# Patient Record
Sex: MALE | Race: WHITE | Employment: FULL TIME | URBAN - NONMETROPOLITAN AREA
[De-identification: names, ages, dates, MRNs, and addresses within clinical notes are randomized per-mention and may not be internally consistent; named-entity substitution may affect disease eponyms.]

---

## 2023-02-09 ENCOUNTER — OFFICE VISIT (OUTPATIENT)
Dept: ENT CLINIC | Age: 43
End: 2023-02-09
Payer: COMMERCIAL

## 2023-02-09 ENCOUNTER — APPOINTMENT (OUTPATIENT)
Dept: CT IMAGING | Age: 43
End: 2023-02-09
Payer: COMMERCIAL

## 2023-02-09 ENCOUNTER — HOSPITAL ENCOUNTER (EMERGENCY)
Age: 43
Discharge: HOME | End: 2023-02-09
Payer: COMMERCIAL

## 2023-02-09 VITALS
SYSTOLIC BLOOD PRESSURE: 154 MMHG | OXYGEN SATURATION: 91 % | DIASTOLIC BLOOD PRESSURE: 92 MMHG | RESPIRATION RATE: 20 BRPM | WEIGHT: 315 LBS | HEART RATE: 106 BPM | TEMPERATURE: 99.3 F | HEIGHT: 72 IN | BODY MASS INDEX: 42.66 KG/M2

## 2023-02-09 VITALS — SYSTOLIC BLOOD PRESSURE: 138 MMHG | DIASTOLIC BLOOD PRESSURE: 86 MMHG

## 2023-02-09 DIAGNOSIS — J36 PERITONSILLAR ABSCESS: Primary | ICD-10-CM

## 2023-02-09 DIAGNOSIS — J38.3 LESION OF VOCAL CORD: ICD-10-CM

## 2023-02-09 LAB
ALBUMIN SERPL-MCNC: 3.7 G/DL (ref 3.5–5.2)
ALP BLD-CCNC: 128 U/L (ref 40–130)
ALT SERPL-CCNC: 31 U/L (ref 5–41)
ANION GAP SERPL CALCULATED.3IONS-SCNC: 13 MMOL/L (ref 7–19)
AST SERPL-CCNC: 47 U/L (ref 5–40)
BASOPHILS ABSOLUTE: 0 K/UL (ref 0–0.2)
BASOPHILS RELATIVE PERCENT: 0.3 % (ref 0–1)
BILIRUB SERPL-MCNC: 4.2 MG/DL (ref 0.2–1.2)
BUN BLDV-MCNC: 12 MG/DL (ref 6–20)
CALCIUM SERPL-MCNC: 8.4 MG/DL (ref 8.6–10)
CHLORIDE BLD-SCNC: 102 MMOL/L (ref 98–111)
CO2: 21 MMOL/L (ref 22–29)
CREAT SERPL-MCNC: 0.5 MG/DL (ref 0.5–1.2)
EOSINOPHILS ABSOLUTE: 0 K/UL (ref 0–0.6)
EOSINOPHILS RELATIVE PERCENT: 0.3 % (ref 0–5)
GFR SERPL CREATININE-BSD FRML MDRD: >60 ML/MIN/{1.73_M2}
GLUCOSE BLD-MCNC: 121 MG/DL (ref 74–109)
HCT VFR BLD CALC: 43.2 % (ref 42–52)
HEMOGLOBIN: 14.8 G/DL (ref 14–18)
IMMATURE GRANULOCYTES #: 0.1 K/UL
LYMPHOCYTES ABSOLUTE: 1.1 K/UL (ref 1.1–4.5)
LYMPHOCYTES RELATIVE PERCENT: 8.7 % (ref 20–40)
MCH RBC QN AUTO: 30.6 PG (ref 27–31)
MCHC RBC AUTO-ENTMCNC: 34.3 G/DL (ref 33–37)
MCV RBC AUTO: 89.3 FL (ref 80–94)
MONOCYTES ABSOLUTE: 1 K/UL (ref 0–0.9)
MONOCYTES RELATIVE PERCENT: 8.1 % (ref 0–10)
NEUTROPHILS ABSOLUTE: 10.5 K/UL (ref 1.5–7.5)
NEUTROPHILS RELATIVE PERCENT: 81.7 % (ref 50–65)
PDW BLD-RTO: 14 % (ref 11.5–14.5)
PLATELET # BLD: 46 K/UL (ref 130–400)
PMV BLD AUTO: 11.3 FL (ref 9.4–12.4)
POTASSIUM SERPL-SCNC: 3.7 MMOL/L (ref 3.5–5)
RBC # BLD: 4.84 M/UL (ref 4.7–6.1)
SODIUM BLD-SCNC: 136 MMOL/L (ref 136–145)
TOTAL PROTEIN: 7.4 G/DL (ref 6.6–8.7)
WBC # BLD: 12.8 K/UL (ref 4.8–10.8)

## 2023-02-09 PROCEDURE — 96365 THER/PROPH/DIAG IV INF INIT: CPT

## 2023-02-09 PROCEDURE — 99204 OFFICE O/P NEW MOD 45 MIN: CPT | Performed by: OTOLARYNGOLOGY

## 2023-02-09 PROCEDURE — 42700 I&D ABSCESS PERITONSILLAR: CPT | Performed by: OTOLARYNGOLOGY

## 2023-02-09 PROCEDURE — 96376 TX/PRO/DX INJ SAME DRUG ADON: CPT

## 2023-02-09 PROCEDURE — 80053 COMPREHEN METABOLIC PANEL: CPT

## 2023-02-09 PROCEDURE — 96375 TX/PRO/DX INJ NEW DRUG ADDON: CPT

## 2023-02-09 PROCEDURE — 6360000002 HC RX W HCPCS: Performed by: PHYSICIAN ASSISTANT

## 2023-02-09 PROCEDURE — 36415 COLL VENOUS BLD VENIPUNCTURE: CPT

## 2023-02-09 PROCEDURE — 85025 COMPLETE CBC W/AUTO DIFF WBC: CPT

## 2023-02-09 PROCEDURE — 87040 BLOOD CULTURE FOR BACTERIA: CPT

## 2023-02-09 PROCEDURE — 70491 CT SOFT TISSUE NECK W/DYE: CPT

## 2023-02-09 PROCEDURE — 2580000003 HC RX 258: Performed by: PHYSICIAN ASSISTANT

## 2023-02-09 PROCEDURE — 6360000004 HC RX CONTRAST MEDICATION: Performed by: PHYSICIAN ASSISTANT

## 2023-02-09 PROCEDURE — 99285 EMERGENCY DEPT VISIT HI MDM: CPT

## 2023-02-09 RX ORDER — CLINDAMYCIN PALMITATE HYDROCHLORIDE 75 MG/5ML
150 SOLUTION ORAL 3 TIMES DAILY
Qty: 300 ML | Refills: 0 | Status: SHIPPED | OUTPATIENT
Start: 2023-02-09 | End: 2023-02-11 | Stop reason: ALTCHOICE

## 2023-02-09 RX ORDER — 0.9 % SODIUM CHLORIDE 0.9 %
1000 INTRAVENOUS SOLUTION INTRAVENOUS ONCE
Status: COMPLETED | OUTPATIENT
Start: 2023-02-09 | End: 2023-02-09

## 2023-02-09 RX ORDER — MORPHINE SULFATE 4 MG/ML
4 INJECTION, SOLUTION INTRAMUSCULAR; INTRAVENOUS ONCE
Status: COMPLETED | OUTPATIENT
Start: 2023-02-09 | End: 2023-02-09

## 2023-02-09 RX ORDER — DEXAMETHASONE SODIUM PHOSPHATE 10 MG/ML
10 INJECTION, SOLUTION INTRAMUSCULAR; INTRAVENOUS ONCE
Status: COMPLETED | OUTPATIENT
Start: 2023-02-09 | End: 2023-02-09

## 2023-02-09 RX ADMIN — MORPHINE SULFATE 4 MG: 4 INJECTION, SOLUTION INTRAMUSCULAR; INTRAVENOUS at 11:44

## 2023-02-09 RX ADMIN — AMPICILLIN SODIUM AND SULBACTAM SODIUM 3000 MG: 2; 1 INJECTION, POWDER, FOR SOLUTION INTRAMUSCULAR; INTRAVENOUS at 11:44

## 2023-02-09 RX ADMIN — DEXAMETHASONE SODIUM PHOSPHATE 10 MG: 10 INJECTION, SOLUTION INTRAMUSCULAR; INTRAVENOUS at 11:44

## 2023-02-09 RX ADMIN — IOPAMIDOL 70 ML: 755 INJECTION, SOLUTION INTRAVENOUS at 12:41

## 2023-02-09 RX ADMIN — MORPHINE SULFATE 4 MG: 4 INJECTION, SOLUTION INTRAMUSCULAR; INTRAVENOUS at 14:59

## 2023-02-09 RX ADMIN — SODIUM CHLORIDE 1000 ML: 9 INJECTION, SOLUTION INTRAVENOUS at 12:51

## 2023-02-09 ASSESSMENT — PAIN SCALES - GENERAL
PAINLEVEL_OUTOF10: 9
PAINLEVEL_OUTOF10: 9

## 2023-02-09 ASSESSMENT — ENCOUNTER SYMPTOMS
VOICE CHANGE: 1
SORE THROAT: 1
EYES NEGATIVE: 1
TROUBLE SWALLOWING: 1
GASTROINTESTINAL NEGATIVE: 1
TROUBLE SWALLOWING: 1
ALLERGIC/IMMUNOLOGIC NEGATIVE: 1
COUGH: 0
SHORTNESS OF BREATH: 0
SORE THROAT: 1
RESPIRATORY NEGATIVE: 1

## 2023-02-09 ASSESSMENT — PAIN DESCRIPTION - LOCATION: LOCATION: MOUTH

## 2023-02-09 ASSESSMENT — PAIN - FUNCTIONAL ASSESSMENT: PAIN_FUNCTIONAL_ASSESSMENT: 0-10

## 2023-02-09 NOTE — ED PROVIDER NOTES
[facility]  eMERGENCY dEPARTMENT eNCOUnter      Pt Name: Zenobia Earl  MRN: [id number]  [place] 1980  Date of evaluation: 2/9/2023  Provider: Florence Soria, [address] [place] [address]       Chief Complaint   Patient presents with    Oral Swelling     Pt arrived to the ed with c/o swelling in his mouth. Onset Tuesday. Pt started on Zpak on Tuesday but getting worse. HISTORY OF PRESENT ILLNESS   (Location/Symptom, Timing/Onset,Context/Setting, Quality, Duration, Modifying Factors, Severity)  Note limiting factors. Zenobia Earl is a 43 y.o. male without significant medical history who presents to the emergency department with complaint of neck swelling and sore throat. The patient's sore throat started 2 days ago on Tuesday as mild irritation. The patient's symptoms significantly worsened through the night to now being severe and difficulty with swallowing liquids. He has not been able to eat for the last 24 to 48 hours. They deny any significant fever. Patient denies chest pain or shortness of breath. He denies vomiting. He denies any other URI symptoms of cough or congestion. NursingNotes were reviewed. REVIE with history of OF SYSTEMS    (2-9 systems for level 4, 10 or more for level 5)     Review of Systems   Constitutional:  Negative for chills and fever. HENT:  Positive for sore throat, trouble swallowing and voice change. Negative for congestion. Respiratory:  Negative for cough and shortness of breath. Cardiovascular:  Negative for chest pain. Musculoskeletal:  Positive for neck pain. Neurological:  Negative for dizziness and headaches. All other systems reviewed and are negative. PAST MEDICALHISTORY   History reviewed. No pertinent past medical history. SURGICAL HISTORY     History reviewed. No pertinent surgical history. CURRENT MEDICATIONS   There are no discharge medications for this patient.       ALLERGIES     Bee venom    FAMILY HISTORY History reviewed. No pertinent family history. SOCIAL HISTORY       Social History     Socioeconomic History    Marital status:      Spouse name: None    Number of children: None    Years of education: None    Highest education level: None   Tobacco Use    Smoking status: Never    Smokeless tobacco: Never   Vaping Use    Vaping Use: Never used   Substance and Sexual Activity    Alcohol use: Yes     Comment: 18 pack a week of beer    Drug use: Not Currently       SCREENINGS    Luis Coma Scale  Eye Opening: Spontaneous  Best Verbal Response: Oriented  Best Motor Response: Obeys commands  Luis Coma Scale Score: 15        PHYSICAL EXAM    (up to 7 for level 4, 8 or more for level 5)     ED Triage Vitals [02/09/23 1111]   BP Temp Temp Source Heart Rate Resp SpO2 Height Weight   (!) 162/101 99.3 °F (37.4 °C) Oral (!) 123 15 94 % 6' (1.829 m) (!) 350 lb (158.8 kg)       Physical Exam  Vitals and nursing note reviewed. Constitutional:       General: He is not in acute distress. Appearance: Normal appearance. He is obese. He is ill-appearing. He is not toxic-appearing or diaphoretic. HENT:      Head: Normocephalic and atraumatic. Jaw: Trismus, tenderness, swelling (left) and pain on movement present. Right Ear: Tympanic membrane, ear canal and external ear normal.      Left Ear: Tympanic membrane, ear canal and external ear normal.      Nose: Nose normal.      Mouth/Throat:      Mouth: Mucous membranes are moist.      Pharynx: Pharyngeal swelling and posterior oropharyngeal erythema present. No oropharyngeal exudate. Tonsils: Tonsillar abscess present. No tonsillar exudate. 1+ on the right. 4+ on the left. Comments: Uvula deviation toward the right side. Significant left tonsil swelling  Eyes:      Extraocular Movements: Extraocular movements intact. Conjunctiva/sclera: Conjunctivae normal.      Pupils: Pupils are equal, round, and reactive to light.    Cardiovascular: Rate and Rhythm: Normal rate and regular rhythm. Pulses: Normal pulses. Heart sounds: Normal heart sounds. Pulmonary:      Effort: Pulmonary effort is normal. No respiratory distress. Breath sounds: Normal breath sounds. Chest:      Chest wall: No tenderness. Musculoskeletal:      Cervical back: Normal range of motion and neck supple. Tenderness present. No rigidity. Lymphadenopathy:      Cervical: Cervical adenopathy (Left) present. Skin:     General: Skin is warm and dry. Neurological:      General: No focal deficit present. Mental Status: He is alert and oriented to person, place, and time. DIAGNOSTIC RESULTS     RADIOLOGY:  Non-plain film images such as CT, Ultrasound and MRI are read by the radiologist. Plain radiographic images are visualized and preliminarily interpreted bythe emergency physician with the below findings:    CT SOFT TISSUE NECK W CONTRAST   Final Result   1. Abnormal masslike thickening extending from the left parapharyngeal soft   tissues to the level of the cords, and involving the cords. There is significant   airway narrowing in the posterior oropharynx due to the left parapharyngeal area   of masslike thickening. Findings are highly concerning for malignancy. ENT   follow-up is suggested. 2.Multistation adenopathy in the left neck. 3.Asymmetric enlargement of the left submandibular gland, of uncertain   etiology.           LABS:  Labs Reviewed   CBC WITH AUTO DIFFERENTIAL - Abnormal; Notable for the following components:       Result Value    WBC 12.8 (*)     Platelets 46 (*)     Neutrophils % 81.7 (*)     Lymphocytes % 8.7 (*)     Neutrophils Absolute 10.5 (*)     Monocytes Absolute 1.00 (*)     All other components within normal limits   COMPREHENSIVE METABOLIC PANEL - Abnormal; Notable for the following components:    CO2 21 (*)     Glucose 121 (*)     Calcium 8.4 (*)     Total Bilirubin 4.2 (*)     AST 47 (*)     All other components within normal limits   CULTURE, BLOOD 1   CULTURE, BLOOD 2       All other labs were within normal range or not returned as of this dictation. EMERGENCY DEPARTMENT COURSE and DIFFERENTIAL DIAGNOSIS/MDM:   Vitals:    Vitals:    02/09/23 1134 02/09/23 1149 02/09/23 1438 02/09/23 1502   BP: (!) 162/102 (!) 154/92     Pulse: (!) 119 (!) 113 (!) 101 (!) 106   Resp: 29 15 20 20   Temp:       TempSrc:       SpO2: 90% 91% 90% 91%   Weight:       Height:           MDM  Patient is a 42-year-old male presents to the ER with significant swelling and pain on the left side. Vital signs are concerning for hypertension and tachycardia. These did improve with pain control and fluids. He did have leukocytosis. On physical exam, I do have concern for peritonsillar abscess based on uvular deviation. He is tolerating his own secretions on physical exam but is not able to tolerate oral intake. CT was obtained. Prior to results, I did reach out to Dr. Pawel Silva, ENT at [facility]. He reviewed the films himself and suspects peritonsillar abscess as well. He recommended the patient be discharged and sent directly to ENT office. Patient was given a dose of pain medication prior to discharge. In the ER, I did give him a dose of Unasyn, morphine, and steroid to help with symptom control. Blood cultures were obtained prior to antibiotic administration. CT results did come back afterwards showing an abnormal masslike thickening extending from the left parapharyngeal soft tissues to the left vocal cords in involving the cords with significant airway narrowing. Return precautions were given to the patient and his wife prior to leaving. I will not add any further treatment at this time since they are going directly to specialist to evaluate in the office. All questions were answered. CONSULTS:  Dr Pawel Silva, ENT    FINAL IMPRESSION      1.  Peritonsillar abscess          DISPOSITION/PLAN   DISPOSITION Decision To Discharge 02/09/2023 02:49:25 PM      PATIENT REFERRED TO:  Mil Perales MD  [address] [place] [address]  [phone]          DISCHARGE MEDICATIONS:  There are no discharge medications for this patient.          (Please note that portions of this note were completed with a voice recognition program.  Efforts were made to edit thedictations but occasionally words are mis-transcribed.)    Valera Boxer, PA (electronically signed)     Valera Boxer, [place]  02/09/23 1523

## 2023-02-09 NOTE — ED NOTES
Pt discharged with written instructions, pt wife with pt. Pt verbalized understanding of going directly to ENT office . Pt verbalized understanding.   BUD Valdez RN  02/09/23 0401

## 2023-02-09 NOTE — ED NOTES
Assessed pt and reported findings and concerns about pt condition. Dexter CANTU in room now.      Comfort Engel RN  02/09/23 1878

## 2023-02-09 NOTE — PROGRESS NOTES
2023    Mago Camarena (:  1980) is a 43 y.o. male, Established patient, here for evaluation of the following chief complaint(s):  New Patient (PTA)      Vitals:    23 1605   BP: 138/86       Wt Readings from Last 3 Encounters:   23 (!) 350 lb (158.8 kg)       BP Readings from Last 3 Encounters:   23 138/86   23 (!) 154/92         SUBJECTIVE/OBJECTIVE:    New patient seen today for peritonsillar abscess. For 3 days has had a sore throat on the left. Never had this problem before. CT scan was obtained showing a peritonsillar abscess along with a vocal cord lesion. Patient denies any dysphagia odynophagia chronic sore throat. He currently has significant pain from the PTA. Review of Systems   Constitutional: Negative. HENT:  Positive for sore throat and trouble swallowing. Eyes: Negative. Respiratory: Negative. Cardiovascular: Negative. Gastrointestinal: Negative. Endocrine: Negative. Musculoskeletal: Negative. Skin: Negative. Allergic/Immunologic: Negative. Neurological: Negative. Hematological: Negative. Psychiatric/Behavioral: Negative. Physical Exam  Vitals reviewed. Constitutional:       Appearance: Normal appearance. He is normal weight. HENT:      Head: Normocephalic and atraumatic. Right Ear: Tympanic membrane, ear canal and external ear normal.      Left Ear: Tympanic membrane, ear canal and external ear normal.      Nose: Nose normal.      Mouth/Throat:      Mouth: Mucous membranes are moist.      Pharynx: Oropharynx is clear. Comments: Large PTA left  Eyes:      Extraocular Movements: Extraocular movements intact. Pupils: Pupils are equal, round, and reactive to light. Cardiovascular:      Rate and Rhythm: Normal rate and regular rhythm. Pulmonary:      Effort: Pulmonary effort is normal.      Breath sounds: Normal breath sounds. Musculoskeletal:      Cervical back: Normal range of motion. Skin:     General: Skin is warm and dry. Neurological:      General: No focal deficit present. Mental Status: He is alert and oriented to person, place, and time. Psychiatric:         Mood and Affect: Mood normal.         Behavior: Behavior normal.      Drainage peritonsillar abscess  After obtaining informed consent, the patient was prepped in standard fashion for drainage of peritonsillar abscess. HurriCaine spray was sprayed first to try to anesthetize the oral cavity. Next the soft palate was palpated and full was found on the left. This region was injected with lidocaine with epinephrine. After several minutes a large 18-gauge needle was inserted and purulent fluid was removed. Approximately 5 mL of purulent fluid removed. Patient tolerated well. ASSESSMENT/PLAN:    1. Peritonsillar abscess  2. Lesion of vocal cord  Drained and patient tolerated well. Antibiotics next 10 days. See him back in a week and a feeling better we will plan on scoping to see was going on in his throat. Concerning lesion on vocal cord. Return in about 1 week (around 2/16/2023). An electronic signature was used to authenticate this note. Hiwot Mcmahan MD       Please note that this chart was generated using dragon dictation software. Although every effort was made to ensure the accuracy of this automated transcription, some errors in transcription may have occurred.

## 2023-02-09 NOTE — Clinical Note
Boston Graham was seen and treated in our emergency department on 2/9/2023. He may return to work on 02/14/2023. If you have any questions or concerns, please don't hesitate to call.       Anton Najera

## 2023-02-10 LAB
BLOOD CULTURE, ROUTINE: NORMAL
CULTURE, BLOOD 2: NORMAL

## 2023-02-11 ENCOUNTER — HOSPITAL ENCOUNTER (EMERGENCY)
Age: 43
Discharge: HOME | End: 2023-02-11
Attending: EMERGENCY MEDICINE
Payer: COMMERCIAL

## 2023-02-11 VITALS
HEART RATE: 90 BPM | SYSTOLIC BLOOD PRESSURE: 146 MMHG | HEIGHT: 72 IN | RESPIRATION RATE: 16 BRPM | BODY MASS INDEX: 42.66 KG/M2 | OXYGEN SATURATION: 96 % | WEIGHT: 315 LBS | DIASTOLIC BLOOD PRESSURE: 93 MMHG | TEMPERATURE: 98.9 F

## 2023-02-11 DIAGNOSIS — J36 PERITONSILLAR ABSCESS: Primary | ICD-10-CM

## 2023-02-11 LAB
ALBUMIN SERPL-MCNC: 3.7 G/DL (ref 3.5–5.2)
ALP BLD-CCNC: 142 U/L (ref 40–130)
ALT SERPL-CCNC: 33 U/L (ref 5–41)
ANION GAP SERPL CALCULATED.3IONS-SCNC: 9 MMOL/L (ref 7–19)
AST SERPL-CCNC: 42 U/L (ref 5–40)
BASOPHILS ABSOLUTE: 0.1 K/UL (ref 0–0.2)
BASOPHILS RELATIVE PERCENT: 0.4 % (ref 0–1)
BILIRUB SERPL-MCNC: 1.4 MG/DL (ref 0.2–1.2)
BUN BLDV-MCNC: 10 MG/DL (ref 6–20)
CALCIUM SERPL-MCNC: 8.4 MG/DL (ref 8.6–10)
CHLORIDE BLD-SCNC: 100 MMOL/L (ref 98–111)
CO2: 27 MMOL/L (ref 22–29)
CREAT SERPL-MCNC: 0.6 MG/DL (ref 0.5–1.2)
EOSINOPHILS ABSOLUTE: 0 K/UL (ref 0–0.6)
EOSINOPHILS RELATIVE PERCENT: 0.1 % (ref 0–5)
GFR SERPL CREATININE-BSD FRML MDRD: >60 ML/MIN/{1.73_M2}
GLUCOSE BLD-MCNC: 109 MG/DL (ref 74–109)
HCT VFR BLD CALC: 48.2 % (ref 42–52)
HEMOGLOBIN: 16.3 G/DL (ref 14–18)
IMMATURE GRANULOCYTES #: 0.3 K/UL
LYMPHOCYTES ABSOLUTE: 1.7 K/UL (ref 1.1–4.5)
LYMPHOCYTES RELATIVE PERCENT: 10 % (ref 20–40)
MCH RBC QN AUTO: 30.8 PG (ref 27–31)
MCHC RBC AUTO-ENTMCNC: 33.8 G/DL (ref 33–37)
MCV RBC AUTO: 90.9 FL (ref 80–94)
MONOCYTES ABSOLUTE: 1.8 K/UL (ref 0–0.9)
MONOCYTES RELATIVE PERCENT: 10.5 % (ref 0–10)
NEUTROPHILS ABSOLUTE: 13.2 K/UL (ref 1.5–7.5)
NEUTROPHILS RELATIVE PERCENT: 77.5 % (ref 50–65)
PDW BLD-RTO: 14.3 % (ref 11.5–14.5)
PLATELET # BLD: 95 K/UL (ref 130–400)
PMV BLD AUTO: 11.1 FL (ref 9.4–12.4)
POTASSIUM SERPL-SCNC: 3.7 MMOL/L (ref 3.5–5)
RBC # BLD: 5.3 M/UL (ref 4.7–6.1)
SODIUM BLD-SCNC: 136 MMOL/L (ref 136–145)
TOTAL PROTEIN: 8.2 G/DL (ref 6.6–8.7)
WBC # BLD: 17 K/UL (ref 4.8–10.8)

## 2023-02-11 PROCEDURE — 85025 COMPLETE CBC W/AUTO DIFF WBC: CPT

## 2023-02-11 PROCEDURE — 2580000003 HC RX 258: Performed by: OTOLARYNGOLOGY

## 2023-02-11 PROCEDURE — 80053 COMPREHEN METABOLIC PANEL: CPT

## 2023-02-11 PROCEDURE — 42700 I&D ABSCESS PERITONSILLAR: CPT

## 2023-02-11 PROCEDURE — 99284 EMERGENCY DEPT VISIT MOD MDM: CPT

## 2023-02-11 PROCEDURE — 96375 TX/PRO/DX INJ NEW DRUG ADDON: CPT

## 2023-02-11 PROCEDURE — 36415 COLL VENOUS BLD VENIPUNCTURE: CPT

## 2023-02-11 PROCEDURE — 6360000002 HC RX W HCPCS: Performed by: OTOLARYNGOLOGY

## 2023-02-11 PROCEDURE — 42700 I&D ABSCESS PERITONSILLAR: CPT | Performed by: OTOLARYNGOLOGY

## 2023-02-11 PROCEDURE — 99222 1ST HOSP IP/OBS MODERATE 55: CPT | Performed by: OTOLARYNGOLOGY

## 2023-02-11 PROCEDURE — 96365 THER/PROPH/DIAG IV INF INIT: CPT

## 2023-02-11 PROCEDURE — 6360000002 HC RX W HCPCS: Performed by: EMERGENCY MEDICINE

## 2023-02-11 RX ORDER — AMOXICILLIN AND CLAVULANATE POTASSIUM 875; 125 MG/1; MG/1
1 TABLET, FILM COATED ORAL 2 TIMES DAILY
Qty: 20 TABLET | Refills: 0 | Status: SHIPPED | OUTPATIENT
Start: 2023-02-11 | End: 2023-02-21

## 2023-02-11 RX ORDER — HYDROCODONE BITARTRATE AND ACETAMINOPHEN 7.5; 325 MG/1; MG/1
1 TABLET ORAL EVERY 6 HOURS PRN
Qty: 12 TABLET | Refills: 0 | Status: SHIPPED | OUTPATIENT
Start: 2023-02-11 | End: 2023-02-14

## 2023-02-11 RX ORDER — ONDANSETRON 4 MG/1
4 TABLET, FILM COATED ORAL 3 TIMES DAILY PRN
Qty: 15 TABLET | Refills: 0 | Status: SHIPPED | OUTPATIENT
Start: 2023-02-11

## 2023-02-11 RX ORDER — MORPHINE SULFATE 4 MG/ML
4 INJECTION, SOLUTION INTRAMUSCULAR; INTRAVENOUS ONCE
Status: COMPLETED | OUTPATIENT
Start: 2023-02-11 | End: 2023-02-11

## 2023-02-11 RX ORDER — ONDANSETRON 2 MG/ML
4 INJECTION INTRAMUSCULAR; INTRAVENOUS ONCE
Status: COMPLETED | OUTPATIENT
Start: 2023-02-11 | End: 2023-02-11

## 2023-02-11 RX ORDER — HYDROCODONE BITARTRATE AND ACETAMINOPHEN 7.5; 325 MG/1; MG/1
1 TABLET ORAL EVERY 6 HOURS PRN
Qty: 12 TABLET | Refills: 0 | Status: SHIPPED | OUTPATIENT
Start: 2023-02-11 | End: 2023-02-11 | Stop reason: SDUPTHER

## 2023-02-11 RX ORDER — LORAZEPAM 2 MG/ML
1 INJECTION INTRAMUSCULAR ONCE
Status: COMPLETED | OUTPATIENT
Start: 2023-02-11 | End: 2023-02-11

## 2023-02-11 RX ORDER — AMOXICILLIN AND CLAVULANATE POTASSIUM 500; 125 MG/1; MG/1
1 TABLET, FILM COATED ORAL 3 TIMES DAILY
Qty: 30 TABLET | Refills: 0 | Status: SHIPPED | OUTPATIENT
Start: 2023-02-11 | End: 2023-02-11 | Stop reason: SDUPTHER

## 2023-02-11 RX ORDER — DEXAMETHASONE SODIUM PHOSPHATE 10 MG/ML
10 INJECTION, SOLUTION INTRAMUSCULAR; INTRAVENOUS ONCE
Status: COMPLETED | OUTPATIENT
Start: 2023-02-11 | End: 2023-02-11

## 2023-02-11 RX ORDER — LIDOCAINE HYDROCHLORIDE 10 MG/ML
INJECTION, SOLUTION EPIDURAL; INFILTRATION; INTRACAUDAL; PERINEURAL
Status: DISCONTINUED
Start: 2023-02-11 | End: 2023-02-11 | Stop reason: HOSPADM

## 2023-02-11 RX ADMIN — MORPHINE SULFATE 4 MG: 4 INJECTION, SOLUTION INTRAMUSCULAR; INTRAVENOUS at 11:18

## 2023-02-11 RX ADMIN — AMPICILLIN SODIUM AND SULBACTAM SODIUM 3000 MG: 2; 1 INJECTION, POWDER, FOR SOLUTION INTRAMUSCULAR; INTRAVENOUS at 11:28

## 2023-02-11 RX ADMIN — DEXAMETHASONE SODIUM PHOSPHATE 10 MG: 10 INJECTION, SOLUTION INTRAMUSCULAR; INTRAVENOUS at 11:18

## 2023-02-11 RX ADMIN — ONDANSETRON 4 MG: 2 INJECTION INTRAMUSCULAR; INTRAVENOUS at 11:18

## 2023-02-11 RX ADMIN — LORAZEPAM 1 MG: 2 INJECTION INTRAMUSCULAR at 11:42

## 2023-02-11 ASSESSMENT — ENCOUNTER SYMPTOMS
ABDOMINAL PAIN: 0
SORE THROAT: 1
VOICE CHANGE: 1
COUGH: 0
SHORTNESS OF BREATH: 0
RHINORRHEA: 0
VOMITING: 0
STRIDOR: 0
NAUSEA: 0

## 2023-02-11 ASSESSMENT — PAIN DESCRIPTION - DESCRIPTORS: DESCRIPTORS: PATIENT UNABLE TO DESCRIBE

## 2023-02-11 ASSESSMENT — PAIN - FUNCTIONAL ASSESSMENT: PAIN_FUNCTIONAL_ASSESSMENT: 0-10

## 2023-02-11 ASSESSMENT — PAIN SCALES - GENERAL
PAINLEVEL_OUTOF10: 10
PAINLEVEL_OUTOF10: 7

## 2023-02-11 ASSESSMENT — PAIN DESCRIPTION - LOCATION: LOCATION: THROAT

## 2023-02-11 NOTE — ED NOTES
Called Dr. Daniela Ozuna office.   Left message with answering service      Georgia Duran  02/11/23 3615

## 2023-02-11 NOTE — ED PROVIDER NOTES
[facility] EMERGENCY DEPT  eMERGENCY dEPARTMENT eNCOUnter      Pt Name: Marlene Abad  MRN: [id number]  [place] 1980  Date of evaluation: 2/11/2023  Provider: Fara Scheuermann, MD    CHIEF COMPLAINT       Chief Complaint   Patient presents with    Abscess     Peritonsilar abscess that was drained Thurs with some improvement, but worse now         HISTORY OF PRESENT ILLNESS   (Location/Symptom, Timing/Onset,Context/Setting, Quality, Duration, Modifying Factors, Severity)  Note limiting factors. Marlene Abad is a 43 y.o. male who presents to the emergency department for concern of peritonsillar abscess reaccumulation. Patient states he was doing well until last night when he had increased left throat pain and submandibular swelling. He was seen here on February 9 in the emergency department diagnosed with a peritonsillar abscess and seen in the office by Dr. Kaitlyn Martinez the same day and had this drained by 18-gauge needle and been on clindamycin. HPI    NursingNotes were reviewed. REVIEW OF SYSTEMS    (2-9 systems for level 4, 10 or more for level 5)     Review of Systems   Constitutional:  Negative for chills and fever. HENT:  Positive for sore throat and voice change. Negative for rhinorrhea. Respiratory:  Negative for cough, shortness of breath and stridor. Cardiovascular:  Negative for chest pain. Gastrointestinal:  Negative for abdominal pain, nausea and vomiting. Musculoskeletal:  Negative for neck pain and neck stiffness. Neurological:  Negative for dizziness and headaches. All other systems reviewed and are negative. PAST MEDICALHISTORY   History reviewed. No pertinent past medical history. SURGICAL HISTORY       Past Surgical History:   Procedure Laterality Date    INCISION AND DRAINAGE OF TONSILLAR ABSCESS           CURRENT MEDICATIONS     Discharge Medication List as of 2/11/2023 12:57 PM          ALLERGIES     Bee venom    FAMILY HISTORY     History reviewed.  No pertinent family history. SOCIAL HISTORY       Social History     Socioeconomic History    Marital status:      Spouse name: None    Number of children: None    Years of education: None    Highest education level: None   Tobacco Use    Smoking status: Never    Smokeless tobacco: Never   Vaping Use    Vaping Use: Never used   Substance and Sexual Activity    Alcohol use: Yes     Comment: 18 pack a week of beer    Drug use: Not Currently       SCREENINGS    [place] Coma Scale  Eye Opening: Spontaneous  Best Verbal Response: Oriented  Best Motor Response: Obeys commands  Luis Coma Scale Score: 15        PHYSICAL EXAM    (up to 7 for level 4, 8 or more for level 5)     ED Triage Vitals   BP Temp Temp Source Heart Rate Resp SpO2 Height Weight   02/11/23 0812 02/11/23 0812 02/11/23 0812 02/11/23 0812 02/11/23 0816 02/11/23 0816 02/11/23 0812 02/11/23 0812   (!) 159/89 98.9 °F (37.2 °C) Oral (!) 109 18 93 % 6' (1.829 m) (!) 350 lb (158.8 kg)       Physical Exam  Vitals and nursing note reviewed. Constitutional:       General: He is not in acute distress. Appearance: Normal appearance. He is well-developed. He is obese. He is ill-appearing. He is not diaphoretic. HENT:      Head: Normocephalic and atraumatic. Nose: Nose normal.      Mouth/Throat:      Mouth: Mucous membranes are moist.      Dentition: Normal dentition. Tongue: No lesions. Pharynx: Uvula midline. Tonsils: Tonsillar abscess present. Comments: Left tonsillar edema and fullness    Submandibular swelling and tenderness    Mild trismus and hot potatoe voice  Eyes:      Conjunctiva/sclera: Conjunctivae normal.   Neck:      Trachea: No tracheal deviation. Cardiovascular:      Rate and Rhythm: Normal rate and regular rhythm. Heart sounds: Normal heart sounds. No murmur heard. Pulmonary:      Breath sounds: Normal breath sounds. No stridor. No wheezing or rales. Abdominal:      Palpations: Abdomen is soft.       Tenderness: There is no abdominal tenderness. Musculoskeletal:         General: Normal range of motion. Cervical back: Normal range of motion and neck supple. Skin:     General: Skin is warm and dry. Neurological:      Mental Status: He is alert and oriented to person, place, and time. DIAGNOSTIC RESULTS           No orders to display           LABS:  Labs Reviewed   CBC WITH AUTO DIFFERENTIAL - Abnormal; Notable for the following components:       Result Value    WBC 17.0 (*)     Platelets 95 (*)     Neutrophils % 77.5 (*)     Lymphocytes % 10.0 (*)     Monocytes % 10.5 (*)     Neutrophils Absolute 13.2 (*)     Monocytes Absolute 1.80 (*)     All other components within normal limits   COMPREHENSIVE METABOLIC PANEL - Abnormal; Notable for the following components:    Calcium 8.4 (*)     Total Bilirubin 1.4 (*)     Alkaline Phosphatase 142 (*)     AST 42 (*)     All other components within normal limits       All other labs were within normal range or not returned as of this dictation. EMERGENCY DEPARTMENT COURSE and DIFFERENTIAL DIAGNOSIS/MDM:   Vitals:    Vitals:    02/11/23 0812 02/11/23 0816 02/11/23 1129 02/11/23 1301   BP: (!) 159/89  (!) 144/87 (!) 146/93   Pulse: (!) 109  92 90   Resp:  18 15 16   Temp: 98.9 °F (37.2 °C)   98.9 °F (37.2 °C)   TempSrc: Oral   Oral   SpO2:  93% 94% 96%   Weight: (!) 350 lb (158.8 kg)      Height: 6' (1.829 m)          MDM     Amount and/or Complexity of Data Reviewed  Clinical lab tests: ordered and reviewed  Tests in the radiology section of CPT®: reviewed  Discuss the patient with other providers: yes      Patient had peritonsillar abscess drained on February 9 by Dr. David Miller in the office and returns today due to recurrent symptoms worsened last night. He is maintaining his airway and nontoxic but obviously uncomfortable due to the tonsillar and submandibular swelling.   Discussed the case with Dr. David Miller who has evaluated the patient in the emergency department and performed repeat incision and drainage. Have given a dose of IV antibiotics as well as steroid and pain medication. Patient remained stable here overall feeling better. Switching antibiotic from clindamycin to Augmentin per Dr. Bee Martinez instruction. We will follow-up with him this next Thursday. Understand return precautions. CONSULTS:  IP CONSULT TO OTOLARYNGOLOGY    PROCEDURES:  Unless otherwise noted below, none     Procedures    FINAL IMPRESSION      1. Peritonsillar abscess          DISPOSITION/PLAN   DISPOSITION Decision To Discharge 02/11/2023 12:51:06 PM      PATIENT REFERRED TO:  Hiwot Mcmahan MD  [facility] on 2/16/2023      DISCHARGE MEDICATIONS:  Discharge Medication List as of 2/11/2023 12:57 PM        START taking these medications    Details   amoxicillin-clavulanate (AUGMENTIN) 875-125 MG per tablet Take 1 tablet by mouth 2 times daily for 10 days, Disp-20 tablet, R-0Normal      HYDROcodone-acetaminophen (NORCO) 7.5-325 MG per tablet Take 1 tablet by mouth every 6 hours as needed for Pain for up to 3 days. Intended supply: 3 days.  Take lowest dose possible to manage pain Max Daily Amount: 4 tablets, Disp-12 tablet, R-0Normal      ondansetron (ZOFRAN) 4 MG tablet Take 1 tablet by mouth 3 times daily as needed for Nausea or Vomiting, Disp-15 tablet, R-0Normal                (Please note that portions of this note were completed with a voice recognition program.  Efforts were made to edit thedictations but occasionally words are mis-transcribed.)    Jasbir Dyson MD (electronically signed)  Attending Emergency Physician        Desi Vasquez MD  02/11/23 5790

## 2023-02-11 NOTE — CONSULTS
Department of Otolaryngology  Dr Gaurav Hair Consult Note      Reason for Consult:  PTA  Requesting Physician:  ER    CHIEF COMPLAINT:  sore throat    History Obtained From:  patient    HISTORY OF PRESENT ILLNESS:                The patient is a 43 y.o. male with significant past medical history of PTA who presents with recurrent of symptoms. I drained a left-sided PTA about 2 days ago in the clinic. Patient reports significant symptom improvement and tolerating p.o. the next day. Yesterday the symptoms began to return. Today he reports dysphagia trismus and pain. Past Medical History:    History reviewed. No pertinent past medical history.   Past Surgical History:        Procedure Laterality Date    INCISION AND DRAINAGE OF TONSILLAR ABSCESS       Current Medications:   Current Facility-Administered Medications: morphine sulfate (PF) injection 4 mg, 4 mg, IntraVENous, Once  ondansetron (ZOFRAN) injection 4 mg, 4 mg, IntraVENous, Once  lidocaine PF 1 % injection, , ,   benzocaine (HURRICAINE) 20 % oral spray, , ,   Allergies:  Bee venom    Social History:    Noncontributory  Family History:   Noncontributory  REVIEW OF SYSTEMS:    12 point review of systems complete positive for sore throat dysphagia and trismus    PHYSICAL EXAM:    VITALS:  BP (!) 159/89   Pulse (!) 109   Temp 98.9 °F (37.2 °C) (Oral)   Resp 18   Ht 6' (1.829 m)   Wt (!) 350 lb (158.8 kg)   SpO2 93%   BMI 47.47 kg/m²   CONSTITUTIONAL:  awake, alert, cooperative, no apparent distress, and appears stated age  EYES:  Lids and lashes normal, pupils equal, round and reactive to light, extra ocular muscles intact, sclera clear, conjunctiva normal  ENT:  Normocephalic, without obvious abnormality, atraumatic, sinuses nontender on palpation, external ears without lesions, trismus, fullness left soft palate tender to palpation NECK: Tender to palpation and mild fullness left neck  MUSCULOSKELETAL:  There is no redness, warmth, or swelling of the joints. Full range of motion noted. Motor strength is 5 out of 5 all extremities bilaterally. Tone is normal.  NEUROLOGIC:  Awake, alert, oriented to name, place and time. Cranial nerves II-XII are grossly intact. Motor is 5 out of 5 bilaterally. Cerebellar finger to nose, heel to shin intact. Sensory is intact. Babinski down going, Romberg negative, and gait is normal.  SKIN: Normal    Incision and drainage of PTA  After obtaining verbal consent the patient was sprayed with lidocaine. Next the left soft palate was injected with 1% lidocaine with epinephrine. After several minutes an 18-gauge needle was inserted and approximately 8 mL of blood products were removed. Next an 11-gauge scalpel was used to widen the incision. Patient tolerated well. IMPRESSION/RECOMMENDATIONS:      70-year-old male with history of PTA x2. Recurrence over the last 2 days. Incision and drainage performed today. We will change up his antibiotics. I have placed him on clindamycin but will change him up to Augmentin.

## 2023-02-16 ENCOUNTER — OFFICE VISIT (OUTPATIENT)
Dept: ENT CLINIC | Age: 43
End: 2023-02-16
Payer: COMMERCIAL

## 2023-02-16 VITALS
DIASTOLIC BLOOD PRESSURE: 86 MMHG | SYSTOLIC BLOOD PRESSURE: 146 MMHG | BODY MASS INDEX: 42.66 KG/M2 | WEIGHT: 315 LBS | HEIGHT: 72 IN

## 2023-02-16 DIAGNOSIS — J36 PERITONSILLAR ABSCESS: Primary | ICD-10-CM

## 2023-02-16 PROCEDURE — 99213 OFFICE O/P EST LOW 20 MIN: CPT | Performed by: OTOLARYNGOLOGY

## 2023-02-16 ASSESSMENT — ENCOUNTER SYMPTOMS
EYES NEGATIVE: 1
RESPIRATORY NEGATIVE: 1
ALLERGIC/IMMUNOLOGIC NEGATIVE: 1
GASTROINTESTINAL NEGATIVE: 1

## 2023-02-16 NOTE — PROGRESS NOTES
2023    Dimitris Haney (:  1980) is a 43 y.o. male, Established patient, here for evaluation of the following chief complaint(s):  Follow-up ( Peritonsillar abscess)      Vitals:    23 1056   BP: (!) 146/86   Weight: (!) 350 lb (158.8 kg)   Height: 6' (1.829 m)       Wt Readings from Last 3 Encounters:   23 (!) 350 lb (158.8 kg)   23 (!) 350 lb (158.8 kg)   23 (!) 350 lb (158.8 kg)       BP Readings from Last 3 Encounters:   23 (!) 146/86   23 (!) 146/93   23 138/86         SUBJECTIVE/OBJECTIVE:    Patient seen today for left-sided peritonsillar abscess. I drained him in clinic and then drained it again a few days later in the ER. Currently has no symptoms. Completely resolved and swallowing well. Happy with his results. Review of Systems   Constitutional: Negative. HENT: Negative. Eyes: Negative. Respiratory: Negative. Cardiovascular: Negative. Gastrointestinal: Negative. Endocrine: Negative. Musculoskeletal: Negative. Skin: Negative. Allergic/Immunologic: Negative. Neurological: Negative. Hematological: Negative. Psychiatric/Behavioral: Negative. Physical Exam  Vitals reviewed. Constitutional:       Appearance: Normal appearance. He is normal weight. HENT:      Head: Normocephalic and atraumatic. Right Ear: Tympanic membrane, ear canal and external ear normal.      Left Ear: Tympanic membrane, ear canal and external ear normal.      Nose: Nose normal.      Mouth/Throat:      Mouth: Mucous membranes are moist.      Pharynx: Oropharynx is clear. Eyes:      Extraocular Movements: Extraocular movements intact. Pupils: Pupils are equal, round, and reactive to light. Cardiovascular:      Rate and Rhythm: Normal rate and regular rhythm. Pulmonary:      Effort: Pulmonary effort is normal.      Breath sounds: Normal breath sounds. Musculoskeletal:      Cervical back: Normal range of motion. Skin:     General: Skin is warm and dry. Neurological:      General: No focal deficit present. Mental Status: He is alert and oriented to person, place, and time. Psychiatric:         Mood and Affect: Mood normal.         Behavior: Behavior normal.            ASSESSMENT/PLAN:    1. Peritonsillar abscess  Abscess completely resolved. Follow-up as needed. Return if symptoms worsen or fail to improve. An electronic signature was used to authenticate this note. Kim Patel MD       Please note that this chart was generated using dragon dictation software. Although every effort was made to ensure the accuracy of this automated transcription, some errors in transcription may have occurred.

## 2025-05-19 ENCOUNTER — APPOINTMENT (OUTPATIENT)
Dept: ULTRASOUND IMAGING | Age: 45
DRG: 433 | End: 2025-05-19
Payer: COMMERCIAL

## 2025-05-19 ENCOUNTER — HOSPITAL ENCOUNTER (INPATIENT)
Age: 45
LOS: 4 days | Discharge: HOME | DRG: 433 | End: 2025-05-24
Attending: EMERGENCY MEDICINE | Admitting: HOSPITALIST
Payer: COMMERCIAL

## 2025-05-19 DIAGNOSIS — N49.2 CELLULITIS OF SCROTUM: ICD-10-CM

## 2025-05-19 DIAGNOSIS — K74.60 CIRRHOSIS OF LIVER WITH ASCITES, UNSPECIFIED HEPATIC CIRRHOSIS TYPE (HCC): Primary | ICD-10-CM

## 2025-05-19 DIAGNOSIS — D69.6 THROMBOCYTOPENIA: ICD-10-CM

## 2025-05-19 DIAGNOSIS — A41.9 SEPSIS WITHOUT ACUTE ORGAN DYSFUNCTION, DUE TO UNSPECIFIED ORGANISM (HCC): ICD-10-CM

## 2025-05-19 DIAGNOSIS — R18.8 CIRRHOSIS OF LIVER WITH ASCITES, UNSPECIFIED HEPATIC CIRRHOSIS TYPE (HCC): Primary | ICD-10-CM

## 2025-05-19 LAB
ALBUMIN SERPL-MCNC: 2.5 G/DL (ref 3.5–5.2)
ALP SERPL-CCNC: 181 U/L (ref 40–129)
ALT SERPL-CCNC: 43 U/L (ref 10–50)
ANION GAP SERPL CALCULATED.3IONS-SCNC: 11 MMOL/L (ref 8–16)
AST SERPL-CCNC: 90 U/L (ref 10–50)
B PARAP IS1001 DNA NPH QL NAA+NON-PROBE: NOT DETECTED
B PERT.PT PRMT NPH QL NAA+NON-PROBE: NOT DETECTED
BASOPHILS # BLD: 0.1 K/UL (ref 0–0.2)
BASOPHILS NFR BLD: 1.2 % (ref 0–1)
BILIRUB SERPL-MCNC: 3.3 MG/DL (ref 0.2–1.2)
BNP BLD-MCNC: 93 PG/ML (ref 0–124)
BUN SERPL-MCNC: 9 MG/DL (ref 6–20)
C PNEUM DNA NPH QL NAA+NON-PROBE: NOT DETECTED
CALCIUM SERPL-MCNC: 7.7 MG/DL (ref 8.6–10)
CHLORIDE SERPL-SCNC: 102 MMOL/L (ref 98–107)
CO2 SERPL-SCNC: 21 MMOL/L (ref 22–29)
CREAT SERPL-MCNC: 0.6 MG/DL (ref 0.7–1.2)
EOSINOPHIL # BLD: 0.2 K/UL (ref 0–0.6)
EOSINOPHIL NFR BLD: 3.5 % (ref 0–5)
ERYTHROCYTE [DISTWIDTH] IN BLOOD BY AUTOMATED COUNT: 16.9 % (ref 11.5–14.5)
FLUAV RNA NPH QL NAA+NON-PROBE: NOT DETECTED
FLUBV RNA NPH QL NAA+NON-PROBE: NOT DETECTED
GLUCOSE SERPL-MCNC: 120 MG/DL (ref 70–99)
HADV DNA NPH QL NAA+NON-PROBE: NOT DETECTED
HCOV 229E RNA NPH QL NAA+NON-PROBE: NOT DETECTED
HCOV HKU1 RNA NPH QL NAA+NON-PROBE: NOT DETECTED
HCOV NL63 RNA NPH QL NAA+NON-PROBE: NOT DETECTED
HCOV OC43 RNA NPH QL NAA+NON-PROBE: NOT DETECTED
HCT VFR BLD AUTO: 35.8 % (ref 42–52)
HGB BLD-MCNC: 11.9 G/DL (ref 14–18)
HMPV RNA NPH QL NAA+NON-PROBE: NOT DETECTED
HPIV1 RNA NPH QL NAA+NON-PROBE: NOT DETECTED
HPIV2 RNA NPH QL NAA+NON-PROBE: NOT DETECTED
HPIV3 RNA NPH QL NAA+NON-PROBE: NOT DETECTED
HPIV4 RNA NPH QL NAA+NON-PROBE: NOT DETECTED
IMM GRANULOCYTES # BLD: 0 K/UL
LACTATE BLDV-SCNC: 2.1 MG/DL (ref 0.5–1.9)
LYMPHOCYTES # BLD: 1 K/UL (ref 1.1–4.5)
LYMPHOCYTES NFR BLD: 22.7 % (ref 20–40)
M PNEUMO DNA NPH QL NAA+NON-PROBE: NOT DETECTED
MCH RBC QN AUTO: 30.9 PG (ref 27–31)
MCHC RBC AUTO-ENTMCNC: 33.2 G/DL (ref 33–37)
MCV RBC AUTO: 93 FL (ref 80–94)
MONOCYTES # BLD: 0.5 K/UL (ref 0–0.9)
MONOCYTES NFR BLD: 11.7 % (ref 0–10)
NEUTROPHILS # BLD: 2.6 K/UL (ref 1.5–7.5)
NEUTS SEG NFR BLD: 60.7 % (ref 50–65)
PLATELET # BLD AUTO: 54 K/UL (ref 130–400)
PMV BLD AUTO: 11.1 FL (ref 9.4–12.4)
POTASSIUM SERPL-SCNC: 3.4 MMOL/L (ref 3.5–5.1)
PROT SERPL-MCNC: 7.6 G/DL (ref 6.4–8.3)
RBC # BLD AUTO: 3.85 M/UL (ref 4.7–6.1)
RSV RNA NPH QL NAA+NON-PROBE: NOT DETECTED
RV+EV RNA NPH QL NAA+NON-PROBE: NOT DETECTED
SARS-COV-2 RNA NPH QL NAA+NON-PROBE: NOT DETECTED
SODIUM SERPL-SCNC: 134 MMOL/L (ref 136–145)
WBC # BLD AUTO: 4.3 K/UL (ref 4.8–10.8)

## 2025-05-19 PROCEDURE — 0202U NFCT DS 22 TRGT SARS-COV-2: CPT

## 2025-05-19 PROCEDURE — 99285 EMERGENCY DEPT VISIT HI MDM: CPT

## 2025-05-19 PROCEDURE — 83605 ASSAY OF LACTIC ACID: CPT

## 2025-05-19 PROCEDURE — 83880 ASSAY OF NATRIURETIC PEPTIDE: CPT

## 2025-05-19 PROCEDURE — 36415 COLL VENOUS BLD VENIPUNCTURE: CPT

## 2025-05-19 PROCEDURE — 85025 COMPLETE CBC W/AUTO DIFF WBC: CPT

## 2025-05-19 PROCEDURE — 80053 COMPREHEN METABOLIC PANEL: CPT

## 2025-05-19 PROCEDURE — 87040 BLOOD CULTURE FOR BACTERIA: CPT

## 2025-05-19 PROCEDURE — 76870 US EXAM SCROTUM: CPT

## 2025-05-19 RX ORDER — 0.9 % SODIUM CHLORIDE 0.9 %
1000 INTRAVENOUS SOLUTION INTRAVENOUS ONCE
Status: COMPLETED | OUTPATIENT
Start: 2025-05-19 | End: 2025-05-20

## 2025-05-19 ASSESSMENT — PAIN - FUNCTIONAL ASSESSMENT: PAIN_FUNCTIONAL_ASSESSMENT: 0-10

## 2025-05-19 ASSESSMENT — PAIN SCALES - GENERAL: PAINLEVEL_OUTOF10: 3

## 2025-05-20 ENCOUNTER — APPOINTMENT (OUTPATIENT)
Dept: CT IMAGING | Age: 45
DRG: 433 | End: 2025-05-20
Payer: COMMERCIAL

## 2025-05-20 ENCOUNTER — APPOINTMENT (OUTPATIENT)
Dept: ULTRASOUND IMAGING | Age: 45
DRG: 433 | End: 2025-05-20
Payer: COMMERCIAL

## 2025-05-20 PROBLEM — N50.89: Status: ACTIVE | Noted: 2025-05-20

## 2025-05-20 PROBLEM — R18.8 CIRRHOSIS OF LIVER WITH ASCITES (HCC): Status: ACTIVE | Noted: 2025-05-20

## 2025-05-20 PROBLEM — N50.89 GENITAL EDEMA, MALE: Status: ACTIVE | Noted: 2025-05-20

## 2025-05-20 PROBLEM — R60.1 ANASARCA: Status: ACTIVE | Noted: 2025-05-20

## 2025-05-20 PROBLEM — N43.3 HYDROCELE: Status: ACTIVE | Noted: 2025-05-20

## 2025-05-20 PROBLEM — N49.2 CELLULITIS OF SCROTUM: Status: ACTIVE | Noted: 2025-05-20

## 2025-05-20 PROBLEM — K74.60 CIRRHOSIS OF LIVER WITH ASCITES (HCC): Status: ACTIVE | Noted: 2025-05-20

## 2025-05-20 LAB
ALBUMIN FLD-MCNC: 0.3 G/DL
APPEARANCE FLD: CLEAR
BACTERIA URNS QL MICRO: NEGATIVE /HPF
BILIRUB UR QL STRIP: ABNORMAL
BODY FLD TYPE: NORMAL
BODY FLD TYPE: NORMAL
CLARITY UR: CLEAR
CLOT EVALUATION: NORMAL
COLOR FLD: YELLOW
COLOR UR: ABNORMAL
CRYSTALS URNS MICRO: NORMAL /HPF
EPI CELLS #/AREA URNS AUTO: 1 /HPF (ref 0–5)
GLUCOSE UR STRIP.AUTO-MCNC: NEGATIVE MG/DL
HAV IGM SERPL QL IA: NONREACTIVE
HBV CORE IGM SERPL QL IA: NONREACTIVE
HBV SURFACE AG SERPL QL IA: NORMAL
HCV AB SERPL QL IA: NORMAL
HGB UR STRIP.AUTO-MCNC: NEGATIVE MG/L
HYALINE CASTS #/AREA URNS AUTO: 5 /HPF (ref 0–8)
INR PPP: 1.71 (ref 0.88–1.18)
KETONES UR STRIP.AUTO-MCNC: NEGATIVE MG/DL
LACTATE BLDV-SCNC: 1.8 MG/DL (ref 0.5–1.9)
LACTATE BLDV-SCNC: 2.1 MG/DL (ref 0.5–1.9)
LEUKOCYTE ESTERASE UR QL STRIP.AUTO: ABNORMAL
LYMPHOCYTES NFR FLD: 40 %
MACROPHAGES NFR FLD MANUAL: 35 %
MESOTHL CELL NFR FLD MANUAL: 3 %
MONOCYTES NFR FLD: 16 %
NEUTROPHILS NFR FLD: 6 %
NITRITE UR QL STRIP.AUTO: POSITIVE
NUCLEATED CELLS FLUID: 144 /CUMM
PH UR STRIP.AUTO: 5.5 [PH] (ref 5–8)
PROT UR STRIP.AUTO-MCNC: 30 MG/DL
PROTHROMBIN TIME: 19.9 SEC (ref 12–14.6)
RBC # FLD: <2000 /CUMM
RBC #/AREA URNS AUTO: 4 /HPF (ref 0–4)
SP GR UR STRIP.AUTO: >=1.045 (ref 1–1.03)
TOTAL CELLS COUNTED FLD: 100
UROBILINOGEN UR STRIP.AUTO-MCNC: 1 E.U./DL
WBC #/AREA URNS AUTO: 1 /HPF (ref 0–5)

## 2025-05-20 PROCEDURE — 99254 IP/OBS CNSLTJ NEW/EST MOD 60: CPT | Performed by: UROLOGY

## 2025-05-20 PROCEDURE — 6360000002 HC RX W HCPCS: Performed by: HOSPITALIST

## 2025-05-20 PROCEDURE — 2580000003 HC RX 258: Performed by: EMERGENCY MEDICINE

## 2025-05-20 PROCEDURE — 94760 N-INVAS EAR/PLS OXIMETRY 1: CPT

## 2025-05-20 PROCEDURE — 1200000000 HC SEMI PRIVATE

## 2025-05-20 PROCEDURE — 81001 URINALYSIS AUTO W/SCOPE: CPT

## 2025-05-20 PROCEDURE — 2580000003 HC RX 258: Performed by: HOSPITALIST

## 2025-05-20 PROCEDURE — 36415 COLL VENOUS BLD VENIPUNCTURE: CPT

## 2025-05-20 PROCEDURE — 2580000003 HC RX 258: Performed by: INTERNAL MEDICINE

## 2025-05-20 PROCEDURE — 74177 CT ABD & PELVIS W/CONTRAST: CPT

## 2025-05-20 PROCEDURE — 89051 BODY FLUID CELL COUNT: CPT

## 2025-05-20 PROCEDURE — 93005 ELECTROCARDIOGRAM TRACING: CPT

## 2025-05-20 PROCEDURE — 6360000002 HC RX W HCPCS: Performed by: INTERNAL MEDICINE

## 2025-05-20 PROCEDURE — 6360000004 HC RX CONTRAST MEDICATION: Performed by: EMERGENCY MEDICINE

## 2025-05-20 PROCEDURE — 96365 THER/PROPH/DIAG IV INF INIT: CPT

## 2025-05-20 PROCEDURE — 6360000002 HC RX W HCPCS: Performed by: EMERGENCY MEDICINE

## 2025-05-20 PROCEDURE — 99222 1ST HOSP IP/OBS MODERATE 55: CPT | Performed by: UROLOGY

## 2025-05-20 PROCEDURE — C1729 CATH, DRAINAGE: HCPCS

## 2025-05-20 PROCEDURE — 0W9G3ZZ DRAINAGE OF PERITONEAL CAVITY, PERCUTANEOUS APPROACH: ICD-10-PCS | Performed by: RADIOLOGY

## 2025-05-20 PROCEDURE — 96367 TX/PROPH/DG ADDL SEQ IV INF: CPT

## 2025-05-20 PROCEDURE — 6370000000 HC RX 637 (ALT 250 FOR IP): Performed by: INTERNAL MEDICINE

## 2025-05-20 PROCEDURE — 99222 1ST HOSP IP/OBS MODERATE 55: CPT | Performed by: HOSPITALIST

## 2025-05-20 PROCEDURE — 51702 INSERT TEMP BLADDER CATH: CPT

## 2025-05-20 PROCEDURE — 83605 ASSAY OF LACTIC ACID: CPT

## 2025-05-20 PROCEDURE — 85610 PROTHROMBIN TIME: CPT

## 2025-05-20 PROCEDURE — 99222 1ST HOSP IP/OBS MODERATE 55: CPT | Performed by: INTERNAL MEDICINE

## 2025-05-20 PROCEDURE — P9047 ALBUMIN (HUMAN), 25%, 50ML: HCPCS | Performed by: INTERNAL MEDICINE

## 2025-05-20 PROCEDURE — 82040 ASSAY OF SERUM ALBUMIN: CPT

## 2025-05-20 PROCEDURE — 80074 ACUTE HEPATITIS PANEL: CPT

## 2025-05-20 RX ORDER — IOPAMIDOL 755 MG/ML
90 INJECTION, SOLUTION INTRAVASCULAR
Status: COMPLETED | OUTPATIENT
Start: 2025-05-20 | End: 2025-05-20

## 2025-05-20 RX ORDER — ACETAMINOPHEN 650 MG/1
650 SUPPOSITORY RECTAL EVERY 6 HOURS PRN
Status: DISCONTINUED | OUTPATIENT
Start: 2025-05-20 | End: 2025-05-24 | Stop reason: HOSPADM

## 2025-05-20 RX ORDER — MAGNESIUM SULFATE IN WATER 40 MG/ML
2000 INJECTION, SOLUTION INTRAVENOUS PRN
Status: DISCONTINUED | OUTPATIENT
Start: 2025-05-20 | End: 2025-05-24 | Stop reason: HOSPADM

## 2025-05-20 RX ORDER — ALBUMIN (HUMAN) 12.5 G/50ML
25 SOLUTION INTRAVENOUS 2 TIMES DAILY
Status: DISCONTINUED | OUTPATIENT
Start: 2025-05-20 | End: 2025-05-20

## 2025-05-20 RX ORDER — SODIUM CHLORIDE 9 MG/ML
INJECTION, SOLUTION INTRAVENOUS PRN
Status: DISCONTINUED | OUTPATIENT
Start: 2025-05-20 | End: 2025-05-24 | Stop reason: HOSPADM

## 2025-05-20 RX ORDER — ENOXAPARIN SODIUM 100 MG/ML
40 INJECTION SUBCUTANEOUS 2 TIMES DAILY
Status: DISCONTINUED | OUTPATIENT
Start: 2025-05-20 | End: 2025-05-23

## 2025-05-20 RX ORDER — POTASSIUM CHLORIDE 7.45 MG/ML
10 INJECTION INTRAVENOUS PRN
Status: DISCONTINUED | OUTPATIENT
Start: 2025-05-20 | End: 2025-05-24 | Stop reason: HOSPADM

## 2025-05-20 RX ORDER — POLYETHYLENE GLYCOL 3350 17 G/17G
17 POWDER, FOR SOLUTION ORAL 2 TIMES DAILY PRN
Status: DISCONTINUED | OUTPATIENT
Start: 2025-05-20 | End: 2025-05-24 | Stop reason: HOSPADM

## 2025-05-20 RX ORDER — SODIUM CHLORIDE 0.9 % (FLUSH) 0.9 %
5-40 SYRINGE (ML) INJECTION EVERY 12 HOURS SCHEDULED
Status: DISCONTINUED | OUTPATIENT
Start: 2025-05-20 | End: 2025-05-24 | Stop reason: HOSPADM

## 2025-05-20 RX ORDER — SODIUM CHLORIDE 0.9 % (FLUSH) 0.9 %
5-40 SYRINGE (ML) INJECTION PRN
Status: DISCONTINUED | OUTPATIENT
Start: 2025-05-20 | End: 2025-05-24 | Stop reason: HOSPADM

## 2025-05-20 RX ORDER — ALBUMIN (HUMAN) 12.5 G/50ML
25 SOLUTION INTRAVENOUS 2 TIMES DAILY
Status: COMPLETED | OUTPATIENT
Start: 2025-05-20 | End: 2025-05-23

## 2025-05-20 RX ORDER — PHYTONADIONE 5 MG/1
10 TABLET ORAL DAILY
Status: COMPLETED | OUTPATIENT
Start: 2025-05-20 | End: 2025-05-22

## 2025-05-20 RX ORDER — MECOBALAMIN 5000 MCG
5 TABLET,DISINTEGRATING ORAL NIGHTLY PRN
Status: DISCONTINUED | OUTPATIENT
Start: 2025-05-20 | End: 2025-05-24 | Stop reason: HOSPADM

## 2025-05-20 RX ORDER — ONDANSETRON 4 MG/1
4 TABLET, ORALLY DISINTEGRATING ORAL EVERY 8 HOURS PRN
Status: DISCONTINUED | OUTPATIENT
Start: 2025-05-20 | End: 2025-05-24 | Stop reason: HOSPADM

## 2025-05-20 RX ORDER — ONDANSETRON 2 MG/ML
4 INJECTION INTRAMUSCULAR; INTRAVENOUS EVERY 6 HOURS PRN
Status: DISCONTINUED | OUTPATIENT
Start: 2025-05-20 | End: 2025-05-24 | Stop reason: HOSPADM

## 2025-05-20 RX ORDER — CALCIUM CARBONATE 500 MG/1
500 TABLET, CHEWABLE ORAL 3 TIMES DAILY PRN
Status: DISCONTINUED | OUTPATIENT
Start: 2025-05-20 | End: 2025-05-24 | Stop reason: HOSPADM

## 2025-05-20 RX ORDER — SPIRONOLACTONE 50 MG/1
50 TABLET, FILM COATED ORAL DAILY
Status: DISCONTINUED | OUTPATIENT
Start: 2025-05-20 | End: 2025-05-24 | Stop reason: HOSPADM

## 2025-05-20 RX ORDER — ACETAMINOPHEN 325 MG/1
650 TABLET ORAL EVERY 6 HOURS PRN
Status: DISCONTINUED | OUTPATIENT
Start: 2025-05-20 | End: 2025-05-24 | Stop reason: HOSPADM

## 2025-05-20 RX ORDER — METRONIDAZOLE 500 MG/100ML
500 INJECTION, SOLUTION INTRAVENOUS EVERY 8 HOURS
Status: DISCONTINUED | OUTPATIENT
Start: 2025-05-20 | End: 2025-05-21

## 2025-05-20 RX ORDER — POTASSIUM CHLORIDE 1500 MG/1
40 TABLET, EXTENDED RELEASE ORAL PRN
Status: DISCONTINUED | OUTPATIENT
Start: 2025-05-20 | End: 2025-05-24 | Stop reason: HOSPADM

## 2025-05-20 RX ADMIN — PIPERACILLIN AND TAZOBACTAM 4500 MG: 4; .5 INJECTION, POWDER, LYOPHILIZED, FOR SOLUTION INTRAVENOUS at 00:27

## 2025-05-20 RX ADMIN — SPIRONOLACTONE 50 MG: 50 TABLET ORAL at 18:03

## 2025-05-20 RX ADMIN — SODIUM CHLORIDE 1000 ML: 0.9 INJECTION, SOLUTION INTRAVENOUS at 00:26

## 2025-05-20 RX ADMIN — ALBUMIN (HUMAN) 25 G: 0.25 INJECTION, SOLUTION INTRAVENOUS at 18:27

## 2025-05-20 RX ADMIN — CEFEPIME 2000 MG: 2 INJECTION, POWDER, FOR SOLUTION INTRAVENOUS at 07:43

## 2025-05-20 RX ADMIN — PHYTONADIONE 10 MG: 5 TABLET ORAL at 18:03

## 2025-05-20 RX ADMIN — CEFEPIME 2000 MG: 2 INJECTION, POWDER, FOR SOLUTION INTRAVENOUS at 18:26

## 2025-05-20 RX ADMIN — IOPAMIDOL 90 ML: 755 INJECTION, SOLUTION INTRAVENOUS at 00:13

## 2025-05-20 RX ADMIN — ENOXAPARIN SODIUM 40 MG: 100 INJECTION SUBCUTANEOUS at 12:58

## 2025-05-20 RX ADMIN — Medication 1500 MG: at 08:21

## 2025-05-20 RX ADMIN — BUMETANIDE 1 MG/HR: 0.25 INJECTION INTRAMUSCULAR; INTRAVENOUS at 12:00

## 2025-05-20 RX ADMIN — METRONIDAZOLE 500 MG: 500 INJECTION, SOLUTION INTRAVENOUS at 17:10

## 2025-05-20 RX ADMIN — VANCOMYCIN HYDROCHLORIDE 1000 MG: 1 INJECTION, POWDER, LYOPHILIZED, FOR SOLUTION INTRAVENOUS at 01:01

## 2025-05-20 RX ADMIN — ENOXAPARIN SODIUM 40 MG: 100 INJECTION SUBCUTANEOUS at 20:08

## 2025-05-20 RX ADMIN — Medication 1500 MG: at 19:05

## 2025-05-20 RX ADMIN — METRONIDAZOLE 500 MG: 500 INJECTION, SOLUTION INTRAVENOUS at 08:21

## 2025-05-20 ASSESSMENT — ENCOUNTER SYMPTOMS
NAUSEA: 0
COUGH: 1
ABDOMINAL PAIN: 0
RESPIRATORY NEGATIVE: 1
CONSTIPATION: 0
COUGH: 0
VOMITING: 0
ABDOMINAL DISTENTION: 1
ABDOMINAL PAIN: 1
SHORTNESS OF BREATH: 0
EYES NEGATIVE: 1
DIARRHEA: 0

## 2025-05-20 ASSESSMENT — PAIN SCALES - GENERAL
PAINLEVEL_OUTOF10: 4
PAINLEVEL_OUTOF10: 0

## 2025-05-20 NOTE — PROGRESS NOTES
Pharmacy Renal Adjustment    Hao Bradley is a 44 y.o. male. Pharmacy has renally adjusted medications per protocol.    Recent Labs     05/19/25 1950   BUN 9       Recent Labs     05/19/25 1950   CREATININE 0.6*       Estimated Creatinine Clearance: 254 mL/min (A) (based on SCr of 0.6 mg/dL (L)).    Height:   Ht Readings from Last 1 Encounters:   05/19/25 1.905 m (6' 3\")     Weight:  Wt Readings from Last 1 Encounters:   05/19/25 (!) 158.8 kg (350 lb)     Plan: Adjust the following medications based on renal function:           Cefepime to 2000 mg IV once over 30 minutes followed by 2000 mg IV every 12 hours extended infusion over 240 minutes     Electronically signed by YAHIR TERRY RPH on 5/20/2025 at 6:46 AM

## 2025-05-20 NOTE — CONSULTS
Consult Note            Date:5/20/2025        Patient Name:Hao Bradley     YOB: 1980     Age:44 y.o.    Reason for consult: New diagnosis of cirrhosis    History of Present Illness   This is a very pleasant 44-year-old male with a history of morbid obesity (BMI 43) and alcohol abuse who presents with progressively worsening scrotal edema, abdominal distention, and lower extremity edema.  He has a longstanding history of heavy alcohol abuse.  CT scan imaging shows new diagnosis of cirrhosis.  Therefore we have been consulted for further evaluation.    He has never been told he had cirrhosis before.  He has had abnormal LFTs in the past.  He stopped drinking 1 month ago after the swelling started.  Prior to this he reports a greater than 10-year history of drinking 10 to 1212 ounce beers about 4-5 times a week.  He denies any alcohol withdrawal symptoms.  Family history is consistent with a mother with cirrhosis who had viral hepatitis.  His acute viral hepatitis panel is negative.  He denies tobacco.  No past surgical GI history.    He has been seen by urology for the scrotal edema who attributed to his volume status from chronic liver disease.  There is question whether there is an overlying infection.  The team has him on broad-spectrum antibiotics currently.  Blood cultures are pending at this time.  He is afebrile without leukocytosis.  Urinalysis is negative for infection.    Labs are as followed: AST 90, ALT 43, , and total bilirubin 33.  Albumin is 2.5.  CT scan with IV contrast shows massive ascites, nodular liver consistent with cirrhosis, and splenomegaly.  Renal function is normal.    Past Medical History     Past Medical History:   Diagnosis Date    Alcohol abuse, in remission     Obesity, morbid (more than 100 lbs over ideal weight or BMI > 40) (HCC)         Past Surgical History     Past Surgical History:   Procedure Laterality Date    INCISION AND DRAINAGE OF TONSILLAR ABSCESS

## 2025-05-20 NOTE — CONSULTS
Consult Note            Date:5/20/2025        Patient Name:Hao Bradley     YOB: 1980     Age:44 y.o.    Inpatient consult to Urology  Consult performed by: Emir Balbuena MD  Consult ordered by: Cong Valentine MD  Reason for consult: Scrotal swelling          Chief Complaint     Chief Complaint   Patient presents with    Groin Swelling     Pt presents to ED with c/o testicles swelling x 5 days. Without injury.           History Obtained From   patient, electronic medical record    History of Present Illness   Patient 44-year-old gentleman who states over the last 5 days he has noted increasing swelling of his genitalia and scrotum.  The skin has become cracked and somewhat excoriated superficially and he has some tenderness when rubbing the skin otherwise no drainage or oozing no fevers no difficulty urinating.  He came to the emergency department he was found to have massive ascites with signs of liver cirrhosis lower extremity edema and anasarca.  Because of the swelling and the skin changes over the scrotum it is felt he has scrotal cellulitis he has been started on antibiotics Vanc,  1 dose of Zosyn, Flagyl and cefepime.  Urology has been consulted.  I was not contacted by the consulting physician or ER physician and so really not clear what the consult is for.  Except for scrotal swelling which is secondary to his anasarca.    Past Medical History     Past Medical History:   Diagnosis Date    Alcohol abuse, in remission     Obesity, morbid (more than 100 lbs over ideal weight or BMI > 40) (HCC)         Past Surgical History     Past Surgical History:   Procedure Laterality Date    INCISION AND DRAINAGE OF TONSILLAR ABSCESS Bilateral 2022        Medications     Prior to Admission medications    Medication Sig Start Date End Date Taking? Authorizing Provider   ondansetron (ZOFRAN) 4 MG tablet Take 1 tablet by mouth 3 times daily as needed for Nausea or Vomiting 2/11/23   Mina Higgins MD

## 2025-05-20 NOTE — PLAN OF CARE
Problem: Discharge Planning  Goal: Discharge to home or other facility with appropriate resources  Outcome: Progressing  Flowsheets (Taken 5/20/2025 6343)  Discharge to home or other facility with appropriate resources:   Identify barriers to discharge with patient and caregiver   Arrange for needed discharge resources and transportation as appropriate   Identify discharge learning needs (meds, wound care, etc)   Arrange for interpreters to assist at discharge as needed   Refer to discharge planning if patient needs post-hospital services based on physician order or complex needs related to functional status, cognitive ability or social support system

## 2025-05-20 NOTE — ED PROVIDER NOTES
[facility] EMERGENCY DEPARTMENT  eMERGENCY dEPARTMENT eNCOUnter      Pt Name: Hao Bradley  MRN: [id number]  Birthdate 1980  Date of evaluation: 5/19/2025  Provider: Kris Yi MD    CHIEF COMPLAINT       Chief Complaint   Patient presents with    Groin Swelling     Pt presents to ED with c/o testicles swelling x 5 days. Without injury.          HISTORY OF PRESENT ILLNESS   (Location/Symptom, Timing/Onset,Context/Setting, Quality, Duration, Modifying Factors, Severity)  Note limiting factors.   Hao Bradley is a 44 y.o. male who presents to the emergency department for relation regarding scrotal swelling and abdominal distention.  Patient states that he initially noticed the symptoms about 5 days ago.  States he has not had any specific injury or trauma to his scrotal area.  Notes that they seem to be swollen bilaterally.  He is still able to urinate.  He is also noticed some increasing abdominal distention and weight gain over the past 1 to 2 weeks.  No prior history of previous fluid retention.  He has not had fevers or chills.    HPI    NursingNotes were reviewed.    REVIEW OF SYSTEMS    (2-9 systems for level 4, 10 or more for level 5)     Review of Systems   Constitutional:  Negative for appetite change, chills and fever.   HENT:  Negative for congestion.    Respiratory:  Negative for cough and shortness of breath.    Cardiovascular:  Negative for chest pain and palpitations.   Gastrointestinal:  Positive for abdominal distention and abdominal pain.   Genitourinary:  Positive for scrotal swelling.   Neurological:  Negative for dizziness and syncope.   All other systems reviewed and are negative.           PAST MEDICALHISTORY   No past medical history on file.      SURGICAL HISTORY       Past Surgical History:   Procedure Laterality Date    INCISION AND DRAINAGE OF TONSILLAR ABSCESS           CURRENT MEDICATIONS     Previous Medications    ONDANSETRON (ZOFRAN) 4 MG TABLET    Take 1 tablet by mouth 3 times    Genitourinary:         Comments: Significant swelling of the scrotum with evidence of skin excoriation.  There is no crepitus with palpation.  Mild, diffuse overlying erythema noted  Musculoskeletal:      Right lower leg: Edema present.      Left lower leg: Edema present.   Skin:     Capillary Refill: Capillary refill takes less than 2 seconds.      Coloration: Skin is not pale.      Findings: No rash.   Neurological:      Mental Status: He is alert and oriented to person, place, and time.   Psychiatric:         Behavior: Behavior is cooperative.         DIAGNOSTIC RESULTS     EKG: All EKG's are interpreted by the Emergency Department Physician and are utilized in the medical decision making for this patient.      1232: Sinus tachycardia at a rate of 108, no evidence of acute ST or T wave changes identified.  QTc: 480 MS.    RADIOLOGY:  Non-plain film images such as CT, Ultrasound and MRI are read by the radiologist. Plain radiographic images are visualized and preliminarily interpreted bythe emergency physician with the below findings:      CT ABDOMEN PELVIS W IV CONTRAST Additional Contrast? None   Final Result   1. Cirrhosis   2. Splenomegaly   3. Ascites   4. Massive amount of fluid within the scrotum which may be either large hydroceles or communication of ascites via the inguinal canals.   5. Pleural effusions right greater than left.   6. Esophageal and splenic varices.           All CT scans are performed using dose optimization techniques as appropriate to the performed exam and includes at least one of the following:  Automated exposure control, adjustment of the mA and/or kV according to size, and the use of iterative    reconstruction technique.        All CT scans are performed using dose optimization techniques as appropriate to the performed exam and include    at least one of the following: Automated exposure control, adjustment of the mA and/or kV according to size, and the use of iterative

## 2025-05-20 NOTE — PROGRESS NOTES
Christina Wiseman 62 y.o. female presents today with   Chief Complaint   Patient presents with    Pain     All over her body     Medication Refill       Foot Pain   The pain is present in the left foot and right foot. This is a chronic problem. The current episode started more than 1 year ago. The problem occurs daily. The problem has been waxing and waning. The quality of the pain is described as aching. The pain is at a severity of 4/10. The pain is moderate. Pertinent negatives include no fever. The symptoms are aggravated by activity. Back Pain  This is a chronic problem. The current episode started more than 1 year ago. The problem occurs daily. The problem has been waxing and waning since onset. The pain is present in the lumbar spine and gluteal. The quality of the pain is described as aching. The pain radiates to the left knee. The pain is at a severity of 7/10. The pain is severe. Pertinent negatives include no chest pain or fever. Hyperlipidemia  This is a chronic problem. The current episode started more than 1 year ago. The problem is controlled. Recent lipid tests were reviewed and are normal. Associated symptoms include myalgias. Pertinent negatives include no chest pain. Current antihyperlipidemic treatment includes statins. The current treatment provides moderate improvement of lipids. Risk factors for coronary artery disease include stress. Diabetes  She presents for her follow-up diabetic visit. She has type 2 diabetes mellitus. Her disease course has been stable. Pertinent negatives for hypoglycemia include no confusion or speech difficulty. Pertinent negatives for diabetes include no blurred vision and no chest pain. Pertinent negatives for hypoglycemia complications include no blackouts. Gastroesophageal Reflux  She complains of heartburn. She reports no chest pain or no choking. This is a recurrent problem. The current episode started more than 1 year ago. The problem occurs frequently.  [facility]   Pharmacy Pharmacokinetic Monitoring Service - Vancomycin     Hao Bradley is a 44 y.o. male starting on vancomycin therapy for skin and soft tissue infection. Pharmacy consulted by Dr Hartman for monitoring and adjustment.    Target Concentration: Goal AUC/-600 mg*hr/L    Additional Antimicrobials: Cefepime; Metronidazole    Pertinent Laboratory Values:   Wt Readings from Last 1 Encounters:   05/19/25 (!) 158.8 kg (350 lb)     Temp Readings from Last 1 Encounters:   05/20/25 98.4 °F (36.9 °C) (Temporal)     Estimated Creatinine Clearance: 254 mL/min (A) (based on SCr of 0.6 mg/dL (L)).  Recent Labs     05/19/25  1950   CREATININE 0.6*   BUN 9   WBC 4.3*     Procalcitonin: N/A    Pertinent Cultures:  Culture Date Source Results   05/20/25 Blood x 2 collected   MRSA Nasal Swab: N/A. Non-respiratory infection.    Plan:  Dosing recommendations based on Bayesian software  Start vancomycin 1500 mg IV Q 8 hours  Anticipated AUC of 485 and trough concentration of 14.6 at steady state  Renal labs as indicated   Vancomycin concentration ordered for 05/21/25 @ 0730   Pharmacy will continue to monitor patient and adjust therapy as indicated    Thank you for the consult,  Geoffrey Marcano RPH  5/20/2025 7:02 AM    problem has been waxing and waning.   smoker  Past Medical History:   Diagnosis Date    Abnormal Pap smear of cervix     Allergies     Chronic back pain     Chronic GERD     Diabetes ([facility] [address] 2019    High cholesterol     Obese     she done wt watcers    Psychosis ([facility] [address]     Smoker      Patient Active Problem List    Diagnosis Date Noted    SCC (squamous cell carcinoma) 2022    Actinic keratoses 2022    Pneumonia 2018    Psychosis ([facility] [address]     Deficient knowledge 2018    Tobacco user 2018    Chronic bilateral low back pain without sciatica 2018    Decreased ROM of lumbar spine 2018    Muscle weakness 2018    Anxiety 10/10/2015    Degeneration of intervertebral disc, site unspecified 10/10/2015    Dysmenorrhea 10/10/2015    Myalgia and myositis, unspecified 10/10/2015    Osteoarthrosis, unspecified whether generalized or localized, other specified sites 10/10/2015    Asthma with acute exacerbation 10/10/2015     Non-compliant patient   NO SHOWED EMG on 10/10/15 10/10/2015    Patient non-compliant, refused service 10/10/2015    Narrowing of intervertebral disc space 10/10/2015    Esotropia of right eye 10/02/2014    Hyperopia of both eyes with astigmatism and presbyopia 10/02/2014    Abnormal glandular Papanicolaou smear of cervix 2006     Past Surgical History:   Procedure Laterality Date     SECTION       Family History   Problem Relation Age of Onset    Rheum Arthritis Mother     Heart Attack Father      Social History     Socioeconomic History    Marital status:      Spouse name: None    Number of children: None    Years of education: None    Highest education level: None   Tobacco Use    Smoking status: Every Day     Packs/day: 0.50     Years: 35.00     Pack years: 17.50     Types: Cigarettes    Smokeless tobacco: Never   Vaping Use    Vaping Use: Never used   Substance and Sexual Activity    Alcohol use: No    Drug use: No    Sexual activity: Not Currently     Social Determinants of Health     Financial Resource Strain: Low Risk     Difficulty of Paying Living Expenses: Not very hard   Food Insecurity: No Food Insecurity    Worried About Running Out of Food in the Last Year: Never true    Ran Out of Food in the Last Year: Never true     Allergies   Allergen Reactions    Lipitor [Atorvastatin]      Leg pain    Metformin And Related      Rash  Abdominal cramps       Review of Systems   Constitutional:  Negative for chills and fever. HENT:  Negative for facial swelling and nosebleeds. Eyes:  Negative for blurred vision, photophobia and visual disturbance. Respiratory:  Negative for apnea and choking. Cardiovascular:  Negative for chest pain and palpitations. Gastrointestinal:  Positive for heartburn. Negative for abdominal distention and blood in stool. Genitourinary:  Negative for enuresis, hematuria and vaginal bleeding. Musculoskeletal:  Positive for arthralgias, back pain and myalgias. Negative for gait problem and joint swelling. Skin:  Negative for rash. Neurological:  Negative for syncope and speech difficulty. Hematological:  Does not bruise/bleed easily. Psychiatric/Behavioral:  Negative for confusion, hallucinations and suicidal ideas. Vitals:    01/11/23 1355   BP: 110/62   Site: Left Upper Arm   Position: Sitting   Cuff Size: Large Adult   Pulse: 83   Temp: 97.8 °F (36.6 °C)   TempSrc: Temporal   SpO2: 94%   Weight: 190 lb 9.6 oz (86.5 kg)   Height: 5' 6\" (1.676 m)       Physical Exam  Constitutional:       Appearance: She is well-developed. HENT:      Head: Normocephalic. Eyes:      Conjunctiva/sclera: Conjunctivae normal.   Cardiovascular:      Rate and Rhythm: Normal rate and regular rhythm. Heart sounds: Normal heart sounds. Pulmonary:      Breath sounds: Normal breath sounds. Abdominal:      General: There is no distension. Musculoskeletal:         General: Normal range of motion.       Cervical back: Normal range of motion. Skin:     Coloration: Skin is not jaundiced. Neurological:      Mental Status: She is alert and oriented to person, place, and time. Psychiatric:         Mood and Affect: Mood normal.     Assessment/Plan  Giselle Moseley was seen today for pain and medication refill. Diagnoses and all orders for this visit:    Chronic left-sided low back pain with left-sided sciatica  -     cyclobenzaprine (FLEXERIL) 10 MG tablet; take 1 tablet by mouth 2 times a day prn    Gastroesophageal reflux disease without esophagitis  -     omeprazole (PRILOSEC) 40 MG delayed release capsule; Take 1 capsule by mouth daily    Hyperlipidemia, unspecified hyperlipidemia type  -     rosuvastatin (CRESTOR) 5 MG tablet; Take 1 tablet by mouth nightly    Controlled type 2 diabetes mellitus without complication, without long-term current use of insulin ([facility])  -     FreeStyle Lancets MISC; Inject 1 each into the skin 2 times daily  -     blood glucose test strips (FREESTYLE LITE) strip; use 1 TEST STRIP to TEST BLOOD SUGAR two to three times a day    Smoker  -     albuterol sulfate HFA (VENTOLIN HFA) 108 (90 Base) MCG/ACT inhaler; inhale 2 puffs by mouth and INTO THE LUNGS every 6 hours if needed for wheezing  -     nicotine (NICODERM CQ) 21 MG/24HR; Place 1 patch onto the skin every 24 hours    Foot pain, bilateral  -     [facility] - Samy Sorensen DPM, Podiatry, Echo    Vitamin D deficiency  -     Cholecalciferol (VITAMIN D3) 1.25 MG (80333 UT) CAPS; take 1 capsule by mouth every week      Return in about 6 months (around 7/11/2023), or if symptoms worsen or fail to improve.     Renetta Calle MD

## 2025-05-20 NOTE — PROGRESS NOTES
Hospitalist Progress Note        PCP: Marjan Cobb APRN - NP    Date of Admission: 5/19/2025    Length of Stay: 0    Chief Complaint: scrotal edema.       Gradually progressing abdominal and LE edema.     Past several days noted drastically worsening scrotal edema.     Reports Hx of recreational alcohol use, but denies any Hx of alcohol abuse.   Denies any prior Hx of diagnosis of cirrhosis.     His significant other is at bedside and confirms his report.           Subjective: ongoing persisting marked abdominal and scrotal edema       Medications:  Reviewed    Infusion Medications    sodium chloride      bumetanide (BUMEX) 12.5 mg in sodium chloride 0.9 % 125 mL infusion 1 mg/hr (05/20/25 1200)     Scheduled Medications    sodium chloride flush  5-40 mL IntraVENous 2 times per day    enoxaparin  40 mg SubCUTAneous BID    metroNIDAZOLE  500 mg IntraVENous Q8H    cefepime  2,000 mg IntraVENous Q12H    vancomycin  1,500 mg IntraVENous Q8H    vancomycin (VANCOCIN) intermittent dosing (placeholder)   Other RX Placeholder     PRN Meds: sodium chloride flush, sodium chloride, potassium chloride **OR** potassium alternative oral replacement **OR** potassium chloride, magnesium sulfate, ondansetron **OR** ondansetron, acetaminophen **OR** acetaminophen, sodium phosphate 25.41 mmol in sodium chloride 0.9 % 250 mL IVPB **OR** sodium phosphate 50.82 mmol in sodium chloride 0.9 % 250 mL IVPB, polyethylene glycol, melatonin, calcium carbonate    No intake or output data in the 24 hours ending 05/20/25 1235    Physical Exam Performed:    /62   Pulse 100   Temp 98.4 °F (36.9 °C) (Temporal)   Resp 20   Ht 1.905 m (6' 3\")   Wt (!) 158.8 kg (350 lb)   SpO2 93%   BMI 43.75 kg/m²     General appearance: No apparent distress, appears stated age and cooperative.  HEENT: Pupils equal, round, and reactive to light. Conjunctivae/corneas clear.  Neck: Supple, with full range of motion. No jugular venous distention. Trachea  midline.  Respiratory:  Normal respiratory effort. Clear to auscultation, bilaterally without Rales/Wheezes/Rhonchi.  Cardiovascular: Regular rate and rhythm with normal S1/S2 without murmurs, rubs or gallops.  Abdomen: Soft, distended and tense, no peritoneal signs, +fluid wave and shifting dullness.   Musculoskeletal: 2+ pitting edema b/l LE.   Skin: Skin color, texture, turgor normal.  No rashes or lesions.  Neurologic:  Neurovascularly intact without any focal sensory/motor deficits. Cranial nerves: II-XII intact, grossly non-focal.  Psychiatric: Alert and oriented, thought content appropriate, normal insight  Capillary Refill: Brisk, 3 seconds, normal   Peripheral Pulses: +2 palpable, equal bilaterally       Labs:   Recent Labs     05/19/25 1950   WBC 4.3*   HGB 11.9*   HCT 35.8*   PLT 54*     Recent Labs     05/19/25 1950   *   K 3.4*      CO2 21*   BUN 9   CREATININE 0.6*   CALCIUM 7.7*     Recent Labs     05/19/25 1950   AST 90*   ALT 43   BILITOT 3.3*   ALKPHOS 181*     No results for input(s): \"INR\" in the last 72 hours.  No results for input(s): \"CKTOTAL\", \"TROPONINI\" in the last 72 hours.    Urinalysis:    No results found for: \"NITRU\", \"WBCUA\", \"BACTERIA\", \"RBCUA\", \"BLOODU\", \"SPECGRAV\", \"GLUCOSEU\"    Radiology:  CT ABDOMEN PELVIS W IV CONTRAST Additional Contrast? None   Final Result   1. Cirrhosis   2. Splenomegaly   3. Ascites   4. Massive amount of fluid within the scrotum which may be either large hydroceles or communication of ascites via the inguinal canals.   5. Pleural effusions right greater than left.   6. Esophageal and splenic varices.           All CT scans are performed using dose optimization techniques as appropriate to the performed exam and includes at least one of the following:  Automated exposure control, adjustment of the mA and/or kV according to size, and the use of iterative    reconstruction technique.        All CT scans are performed using dose optimization

## 2025-05-20 NOTE — ED NOTES
ED TO INPATIENT SBAR HANDOFF    Patient Name: Hao Bradley   : 1980  44 y.o.   Family/Caregiver Present: Yes  Code Status Order: Full Code    C-SSRS: Risk of Suicide: No Risk  Sitter No  Restraints:         Situation  Chief Complaint:   Chief Complaint   Patient presents with    Groin Swelling     Pt presents to ED with c/o testicles swelling x 5 days. Without injury.      Patient Diagnosis: Cellulitis of scrotum [N49.2]     Brief Description of Patient's Condition: Pt presents to the emergency department for relation regarding scrotal swelling and abdominal distention. Patient states that he initially noticed the symptoms about 5 days ago. States he has not had any specific injury or trauma to his scrotal area. Notes that they seem to be swollen bilaterally. He is still able to urinate. He is also noticed some increasing abdominal distention and weight gain over the past 1 to 2 weeks. No prior history of previous fluid retention. He has not had fevers or chills.     Pt being admitted for ascites. Plan is to be NPO after midnight and to have a paracentesis done tomorrow. Pt currently has mcclain catheter in place that is draining. Pt testicles are extremely edematous. He has a rolled up towel underneath the testicles for elevation. He is able to ambulate without assistance, but moves slowly. He currently has a bumex drip infusing      Mental Status: oriented, alert, coherent, logical, thought processes intact, and able to concentrate and follow conversation  Arrived from: home    Imaging:   CT ABDOMEN PELVIS W IV CONTRAST Additional Contrast? None   Final Result   1. Cirrhosis   2. Splenomegaly   3. Ascites   4. Massive amount of fluid within the scrotum which may be either large hydroceles or communication of ascites via the inguinal canals.   5. Pleural effusions right greater than left.   6. Esophageal and splenic varices.           All CT scans are performed using dose optimization techniques as appropriate

## 2025-05-20 NOTE — PROGRESS NOTES
4 Eyes Skin Assessment     NAME:  Hao Bradley  YOB: 1980  MEDICAL RECORD NUMBER:  [id number]    The patient is being assessed for  Admission    I agree that at least one RN has performed a thorough Head to Toe Skin Assessment on the patient. ALL assessment sites listed below have been assessed.      Areas assessed by both nurses:    Head, Face, Ears, Shoulders, Back, Chest, Arms, Elbows, Hands, Sacrum. Buttock, Coccyx, Ischium, Legs. Feet and Heels, and Under Medical Devices         Does the Patient have a Wound? No noted wound(s)       Cole Prevention initiated by RN: No  Wound Care Orders initiated by RN: No    Pressure Injury (Stage 3,4, Unstageable, DTI, NWPT, and Complex wounds) if present, place Wound referral order by RN under : No    New Ostomies, if present place, Ostomy referral order under : No     Nurse 1 eSignature: Electronically signed by Marci Greer RN on 5/20/25 at 6:08 PM CDT    **SHARE this note so that the co-signing nurse can place an eSignature**    Nurse 2 eSignature: Electronically signed by Madelin Moon LPN on 5/20/25 at 6:11 PM CDT    Left Chest Pocket irrigated with irrisept solution.

## 2025-05-20 NOTE — H&P
Exam  Vitals reviewed.   Constitutional:       General: He is not in acute distress.     Appearance: Normal appearance. He is obese. He is not ill-appearing or toxic-appearing.   HENT:      Head: Normocephalic and atraumatic.      Nose: No congestion or rhinorrhea.   Eyes:      General:         Right eye: No discharge.         Left eye: No discharge.   Neck:      Comments: Supple, trachea appears midline  Cardiovascular:      Rate and Rhythm: Normal rate and regular rhythm.      Heart sounds: No murmur heard.     No friction rub. No gallop.   Pulmonary:      Effort: Pulmonary effort is normal. No respiratory distress.      Breath sounds: No stridor. No wheezing, rhonchi or rales.   Abdominal:      General: Bowel sounds are normal.      Tenderness: There is no abdominal tenderness. There is no guarding or rebound.   Musculoskeletal:         General: No tenderness or signs of injury.      Right lower leg: No edema.      Left lower leg: No edema.   Skin:     General: Skin is warm.      Comments: nondiaphoretic   Neurological:      Mental Status: He is alert and oriented to person, place, and time.   Psychiatric:         Mood and Affect: Mood normal.         Behavior: Behavior normal.       LABORATORY DATA:    CBC:   Recent Labs     05/19/25 1950   WBC 4.3*   HGB 11.9*   HCT 35.8*   PLT 54*     BMP:   Recent Labs     05/19/25 1950   *   K 3.4*      CO2 21*   BUN 9   CREATININE 0.6*   CALCIUM 7.7*     Hepatic Profile:   Recent Labs     05/19/25 1950   AST 90*   ALT 43   BILITOT 3.3*   ALKPHOS 181*     Pro-BNP:   Recent Labs     05/19/25 1950   PROBNP 93     EKG:   Not indicated    IMAGING:  CT ABDOMEN PELVIS W IV CONTRAST Additional Contrast? None  Result Date: 5/20/2025  1. Cirrhosis 2. Splenomegaly 3. Ascites 4. Massive amount of fluid within the scrotum which may be either large hydroceles or communication of ascites via the inguinal canals. 5. Pleural effusions right greater than left. 6. Esophageal  and splenic varices.   All CT scans are performed using dose optimization techniques as appropriate to the performed exam and includes at least one of the following:  Automated exposure control, adjustment of the mA and/or kV according to size, and the use of iterative reconstruction technique.  All CT scans are performed using dose optimization techniques as appropriate to the performed exam and include at least one of the following: Automated exposure control, adjustment of the mA and/or kV according to size, and the use of iterative reconstruction technique.  ______________________________________ Electronically signed by: YINKA MOYA M.D. Date:     05/20/2025 Time:    01:01     US SCROTUM AND TESTICLES  Result Date: 5/19/2025   No acute testicular pathology seen.  Extensive scrotal edema with some scrotal wall vascularity and small hydroceles.  Concerning for cellulitis   ______________________________________ Electronically signed by: YINKA MOYA M.D. Date:     05/19/2025 Time:    20:48         ASESSMENTS & PLANS:    Patient Active Problem List   Diagnosis    Cellulitis of scrotum     Scrotal Cellulitis:  Admit to medical asencio  Zosyn 4.5g loading dose in ED  Vanco in ED  PK consult to dose Vanco by level  Cefepime 2g IVQ8h timed off Zosyn  Flagyl 500mg IV Q8h tiimed off of Zosyn  Elevate HoB  Elevate legs  Strict Is and Os  Daily Weights  F/U Blood Cxx x2  CBC with Diff Daily  BMP with Mag reflex daily    Hydrocele:  Elevate testes  Consider Urology consultation    Supportive and Prophylactic Txx:  DVT: Lovenox SQ  GI (PUD) PPx: not indicated  PT: not indicated      Pt seen/examined and admitted to inatient status.  Inpatient status is used for patients with an expected LOS extending past two midnights due to medical therapy and or critical care needs, otherwise patients are placed to OBServation status.    Signed:  Electronically signed by Sam Hartman MD on 5/20/25 at 7:07 AM CDT.

## 2025-05-20 NOTE — PROGRESS NOTES
Hao Bradley arrived to room # 324.   Presented with: Cellulitis of scrotum  Mental Status: Patient is oriented, alert, coherent, logical, thought processes intact, and able to concentrate and follow conversation.   Vitals:    05/20/25 1643   BP: 134/75   Pulse: (!) 104   Resp: 18   Temp: 98.1 °F (36.7 °C)   SpO2: 97%     Patient safety contract and falls prevention contract reviewed with patient Yes.  Oriented Patient to room.  Call light within reach. Yes.  Needs, issues or concerns expressed at this time: no.      Electronically signed by Madelin Moon LPN on 5/20/2025 at 4:46 PM    Note Text (......Xxx Chief Complaint.): This diagnosis correlates with the Detail Level: Zone Other (Free Text): Dr. Viramontes recommended evaluation of lesion

## 2025-05-21 ENCOUNTER — APPOINTMENT (OUTPATIENT)
Dept: ULTRASOUND IMAGING | Age: 45
DRG: 433 | End: 2025-05-21
Payer: COMMERCIAL

## 2025-05-21 LAB
ALBUMIN SERPL-MCNC: 2.7 G/DL (ref 3.5–5.2)
ALP SERPL-CCNC: 153 U/L (ref 40–129)
ALT SERPL-CCNC: 39 U/L (ref 10–50)
ANION GAP SERPL CALCULATED.3IONS-SCNC: 10 MMOL/L (ref 8–16)
AST SERPL-CCNC: 84 U/L (ref 10–50)
BASOPHILS # BLD: 0 K/UL (ref 0–0.2)
BASOPHILS NFR BLD: 0.7 % (ref 0–1)
BILIRUB DIRECT SERPL-MCNC: 1.8 MG/DL (ref 0–0.3)
BILIRUB INDIRECT SERPL-MCNC: 1.7 MG/DL (ref 0–1)
BILIRUB SERPL-MCNC: 3.5 MG/DL (ref 0.2–1.2)
BUN SERPL-MCNC: 6 MG/DL (ref 6–20)
CALCIUM SERPL-MCNC: 7.4 MG/DL (ref 8.6–10)
CHLORIDE SERPL-SCNC: 96 MMOL/L (ref 98–107)
CO2 SERPL-SCNC: 29 MMOL/L (ref 22–29)
CREAT SERPL-MCNC: 0.6 MG/DL (ref 0.7–1.2)
EOSINOPHIL # BLD: 0.1 K/UL (ref 0–0.6)
EOSINOPHIL NFR BLD: 1.7 % (ref 0–5)
ERYTHROCYTE [DISTWIDTH] IN BLOOD BY AUTOMATED COUNT: 17 % (ref 11.5–14.5)
GLUCOSE BLD-MCNC: 127 MG/DL (ref 70–99)
GLUCOSE SERPL-MCNC: 98 MG/DL (ref 70–99)
HCT VFR BLD AUTO: 33.9 % (ref 42–52)
HGB BLD-MCNC: 11.5 G/DL (ref 14–18)
IMM GRANULOCYTES # BLD: 0 K/UL
LYMPHOCYTES # BLD: 0.9 K/UL (ref 1.1–4.5)
LYMPHOCYTES NFR BLD: 20 % (ref 20–40)
MAGNESIUM SERPL-MCNC: 1.3 MG/DL (ref 1.6–2.6)
MAGNESIUM SERPL-MCNC: 1.9 MG/DL (ref 1.6–2.6)
MCH RBC QN AUTO: 30.5 PG (ref 27–31)
MCHC RBC AUTO-ENTMCNC: 33.9 G/DL (ref 33–37)
MCV RBC AUTO: 89.9 FL (ref 80–94)
MONOCYTES # BLD: 0.4 K/UL (ref 0–0.9)
MONOCYTES NFR BLD: 9.9 % (ref 0–10)
NEUTROPHILS # BLD: 2.9 K/UL (ref 1.5–7.5)
NEUTS SEG NFR BLD: 67.5 % (ref 50–65)
PERFORMED ON: ABNORMAL
PLATELET # BLD AUTO: 53 K/UL (ref 130–400)
PMV BLD AUTO: 11.5 FL (ref 9.4–12.4)
POTASSIUM SERPL-SCNC: 2.6 MMOL/L (ref 3.5–5)
POTASSIUM SERPL-SCNC: 2.9 MMOL/L (ref 3.5–5.1)
PROT SERPL-MCNC: 7.4 G/DL (ref 6.4–8.3)
RBC # BLD AUTO: 3.77 M/UL (ref 4.7–6.1)
SODIUM SERPL-SCNC: 135 MMOL/L (ref 136–145)
WBC # BLD AUTO: 4.2 K/UL (ref 4.8–10.8)

## 2025-05-21 PROCEDURE — 1200000000 HC SEMI PRIVATE

## 2025-05-21 PROCEDURE — 6360000002 HC RX W HCPCS: Performed by: INTERNAL MEDICINE

## 2025-05-21 PROCEDURE — P9047 ALBUMIN (HUMAN), 25%, 50ML: HCPCS | Performed by: INTERNAL MEDICINE

## 2025-05-21 PROCEDURE — 80048 BASIC METABOLIC PNL TOTAL CA: CPT

## 2025-05-21 PROCEDURE — 2500000003 HC RX 250 WO HCPCS: Performed by: HOSPITALIST

## 2025-05-21 PROCEDURE — 2580000003 HC RX 258: Performed by: INTERNAL MEDICINE

## 2025-05-21 PROCEDURE — 6370000000 HC RX 637 (ALT 250 FOR IP): Performed by: INTERNAL MEDICINE

## 2025-05-21 PROCEDURE — 99232 SBSQ HOSP IP/OBS MODERATE 35: CPT | Performed by: INTERNAL MEDICINE

## 2025-05-21 PROCEDURE — 84132 ASSAY OF SERUM POTASSIUM: CPT

## 2025-05-21 PROCEDURE — 36415 COLL VENOUS BLD VENIPUNCTURE: CPT

## 2025-05-21 PROCEDURE — 85025 COMPLETE CBC W/AUTO DIFF WBC: CPT

## 2025-05-21 PROCEDURE — 83735 ASSAY OF MAGNESIUM: CPT

## 2025-05-21 PROCEDURE — 82962 GLUCOSE BLOOD TEST: CPT

## 2025-05-21 PROCEDURE — 2580000003 HC RX 258: Performed by: HOSPITALIST

## 2025-05-21 PROCEDURE — 6360000002 HC RX W HCPCS: Performed by: HOSPITALIST

## 2025-05-21 PROCEDURE — 94760 N-INVAS EAR/PLS OXIMETRY 1: CPT

## 2025-05-21 PROCEDURE — 99231 SBSQ HOSP IP/OBS SF/LOW 25: CPT | Performed by: NURSE PRACTITIONER

## 2025-05-21 PROCEDURE — 6370000000 HC RX 637 (ALT 250 FOR IP): Performed by: HOSPITALIST

## 2025-05-21 PROCEDURE — 80076 HEPATIC FUNCTION PANEL: CPT

## 2025-05-21 PROCEDURE — 76705 ECHO EXAM OF ABDOMEN: CPT

## 2025-05-21 RX ORDER — MAGNESIUM SULFATE IN WATER 40 MG/ML
4000 INJECTION, SOLUTION INTRAVENOUS ONCE
Status: DISCONTINUED | OUTPATIENT
Start: 2025-05-21 | End: 2025-05-21

## 2025-05-21 RX ORDER — POTASSIUM CHLORIDE 1500 MG/1
40 TABLET, EXTENDED RELEASE ORAL 3 TIMES DAILY
Status: DISCONTINUED | OUTPATIENT
Start: 2025-05-21 | End: 2025-05-24 | Stop reason: HOSPADM

## 2025-05-21 RX ORDER — METHOCARBAMOL 500 MG/1
500 TABLET, FILM COATED ORAL 2 TIMES DAILY
Status: DISCONTINUED | OUTPATIENT
Start: 2025-05-21 | End: 2025-05-24

## 2025-05-21 RX ORDER — POTASSIUM CHLORIDE 1500 MG/1
40 TABLET, EXTENDED RELEASE ORAL ONCE
Status: COMPLETED | OUTPATIENT
Start: 2025-05-21 | End: 2025-05-21

## 2025-05-21 RX ADMIN — POTASSIUM CHLORIDE 10 MEQ: 10 INJECTION, SOLUTION INTRAVENOUS at 09:33

## 2025-05-21 RX ADMIN — METRONIDAZOLE 500 MG: 500 INJECTION, SOLUTION INTRAVENOUS at 00:24

## 2025-05-21 RX ADMIN — POTASSIUM CHLORIDE 10 MEQ: 10 INJECTION, SOLUTION INTRAVENOUS at 20:18

## 2025-05-21 RX ADMIN — CEFEPIME 2000 MG: 2 INJECTION, POWDER, FOR SOLUTION INTRAVENOUS at 02:43

## 2025-05-21 RX ADMIN — POTASSIUM CHLORIDE 10 MEQ: 10 INJECTION, SOLUTION INTRAVENOUS at 18:39

## 2025-05-21 RX ADMIN — METHOCARBAMOL 500 MG: 500 TABLET ORAL at 19:15

## 2025-05-21 RX ADMIN — MAGNESIUM SULFATE HEPTAHYDRATE 2000 MG: 40 INJECTION, SOLUTION INTRAVENOUS at 10:08

## 2025-05-21 RX ADMIN — BUMETANIDE 1 MG/HR: 0.25 INJECTION INTRAMUSCULAR; INTRAVENOUS at 15:34

## 2025-05-21 RX ADMIN — POTASSIUM CHLORIDE 40 MEQ: 1500 TABLET, EXTENDED RELEASE ORAL at 13:41

## 2025-05-21 RX ADMIN — ENOXAPARIN SODIUM 40 MG: 100 INJECTION SUBCUTANEOUS at 09:36

## 2025-05-21 RX ADMIN — ALBUMIN (HUMAN) 25 G: 0.25 INJECTION, SOLUTION INTRAVENOUS at 19:16

## 2025-05-21 RX ADMIN — POTASSIUM CHLORIDE 10 MEQ: 10 INJECTION, SOLUTION INTRAVENOUS at 17:34

## 2025-05-21 RX ADMIN — PHYTONADIONE 10 MG: 5 TABLET ORAL at 09:37

## 2025-05-21 RX ADMIN — SPIRONOLACTONE 50 MG: 50 TABLET ORAL at 09:37

## 2025-05-21 RX ADMIN — MAGNESIUM SULFATE HEPTAHYDRATE 2000 MG: 40 INJECTION, SOLUTION INTRAVENOUS at 11:44

## 2025-05-21 RX ADMIN — WATER 2000 MG: 1 INJECTION INTRAMUSCULAR; INTRAVENOUS; SUBCUTANEOUS at 09:36

## 2025-05-21 RX ADMIN — WATER 2000 MG: 1 INJECTION INTRAMUSCULAR; INTRAVENOUS; SUBCUTANEOUS at 17:32

## 2025-05-21 RX ADMIN — ENOXAPARIN SODIUM 40 MG: 100 INJECTION SUBCUTANEOUS at 19:15

## 2025-05-21 RX ADMIN — POTASSIUM CHLORIDE 40 MEQ: 1500 TABLET, EXTENDED RELEASE ORAL at 17:32

## 2025-05-21 RX ADMIN — METHOCARBAMOL 500 MG: 500 TABLET ORAL at 09:37

## 2025-05-21 RX ADMIN — POTASSIUM CHLORIDE 10 MEQ: 10 INJECTION, SOLUTION INTRAVENOUS at 08:40

## 2025-05-21 RX ADMIN — POTASSIUM CHLORIDE 10 MEQ: 10 INJECTION, SOLUTION INTRAVENOUS at 21:15

## 2025-05-21 RX ADMIN — POTASSIUM CHLORIDE 40 MEQ: 1500 TABLET, EXTENDED RELEASE ORAL at 19:15

## 2025-05-21 RX ADMIN — METRONIDAZOLE 500 MG: 500 INJECTION, SOLUTION INTRAVENOUS at 08:50

## 2025-05-21 RX ADMIN — POTASSIUM CHLORIDE 10 MEQ: 10 INJECTION, SOLUTION INTRAVENOUS at 11:42

## 2025-05-21 RX ADMIN — POTASSIUM CHLORIDE 10 MEQ: 10 INJECTION, SOLUTION INTRAVENOUS at 10:40

## 2025-05-21 RX ADMIN — ALBUMIN (HUMAN) 25 G: 0.25 INJECTION, SOLUTION INTRAVENOUS at 09:46

## 2025-05-21 RX ADMIN — POTASSIUM CHLORIDE 40 MEQ: 1500 TABLET, EXTENDED RELEASE ORAL at 08:41

## 2025-05-21 RX ADMIN — BUMETANIDE 1 MG/HR: 0.25 INJECTION INTRAMUSCULAR; INTRAVENOUS at 02:44

## 2025-05-21 RX ADMIN — Medication 1500 MG: at 02:45

## 2025-05-21 RX ADMIN — POTASSIUM CHLORIDE 10 MEQ: 10 INJECTION, SOLUTION INTRAVENOUS at 22:24

## 2025-05-21 NOTE — PROGRESS NOTES
Urology Progress Note      SUBJECTIVE: Patient subjectively says he feels better after paracentesis and diuresis.  He feels like his scrotal swelling is better he denies any pain.    OBJECTIVE: Status post paracentesis.  Cruz catheter placed for diuresis.    Physical  VITALS:  /87   Pulse (!) 101   Temp 98.2 °F (36.8 °C) (Oral)   Resp 18   Ht 1.905 m (6' 3\")   Wt (!) 152 kg (335 lb 1.6 oz)   SpO2 93%   BMI 41.88 kg/m²   TEMPERATURE:  Current - Temp: 98.2 °F (36.8 °C); Max - Temp  Av.1 °F (36.7 °C)  Min: 98.1 °F (36.7 °C)  Max: 98.2 °F (36.8 °C)   24 HR I&O   Intake/Output Summary (Last 24 hours) at 2025 1725  Last data filed at 2025 1422  Gross per 24 hour   Intake --   Output 83268 ml   Net -01589 ml     BACK: no tenderness in spine or flanks  ABDOMEN: Protuberant.  The posterior edema open his trunk and lower abdomen is less today., soft, distended, and non-tender  HEART:  normal rate and regular rhythm  CHEST: Some shortness of air with movement but no distress  GENITAL/URINARY: Scrotal edema not much change.  He still has some epidermal lysis without ulcerations.  Scrotal wall is nontender no drainage does not appear to have signs of cellulitis no abscess.  Penile edema.  Cruz catheter in place draining bile-stained urine    Data       CBC:   Recent Labs     25   WBC 4.3* 4.2*   HGB 11.9* 11.5*   HCT 35.8* 33.9*   PLT 54* 53*     BMP:    Recent Labs     25  0723 25  1441   * 135*  --    K 3.4* 2.6* 2.9*    96*  --    CO2 21* 29  --    BUN 9 6  --    CREATININE 0.6* 0.6*  --    GLUCOSE 120* 98  --        No results for input(s): \"LABURIN\" in the last 72 hours.  Recent Labs     25   BC No Growth to date.  Any change in status will be called.     Recent Labs     25   BLOODCULT2 No Growth to date.  Any change in status will be called.         U/A:    Lab Results   Component Value Date/Time

## 2025-05-21 NOTE — PROGRESS NOTES
Progress Note            Date:5/21/2025        Patient Name:Hao Bradley     YOB: 1980     Age:44 y.o.    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  No new symptoms to report.    Physical Exam   /87   Pulse (!) 101   Temp 98.2 °F (36.8 °C) (Oral)   Resp 18   Ht 1.905 m (6' 3\")   Wt (!) 152 kg (335 lb 1.6 oz)   SpO2 93%   BMI 41.88 kg/m²      - GENERAL: Alert and oriented x 3. No acute distress. Well-nourished.    - EYES: EOMI. scleral icterus.    - HENT: Moist mucous membranes. No cervical lymphadenopathy.    - LUNGS: Clear to auscultation bilaterally. No accessory muscle use.    - CARDIOVASCULAR: Regular rate and rhythm. No murmur. No JVD.    - ABDOMEN: Soft, non-tender and non-distended. No palpable masses.    - EXTREMITIES: Bilateral lower extremity edema. Non-tender.      Labs    CBC:  Recent Labs     05/19/25  1950 05/21/25  0723   WBC 4.3* 4.2*   RBC 3.85* 3.77*   HGB 11.9* 11.5*   HCT 35.8* 33.9*   MCV 93.0 89.9   RDW 16.9* 17.0*   PLT 54* 53*     CHEMISTRIES:  Recent Labs     05/19/25  1950 05/21/25  0723   * 135*   K 3.4* 2.6*    96*   CO2 21* 29   BUN 9 6   CREATININE 0.6* 0.6*   GLUCOSE 120* 98   MG  --  1.3*     PT/INR:  Recent Labs     05/20/25  1203   PROTIME 19.9*   INR 1.71*     APTT:No results for input(s): \"APTT\" in the last 72 hours.  LIVER PROFILE:  Recent Labs     05/19/25  1950 05/21/25  0723   AST 90* 84*   ALT 43 39   BILIDIR  --  1.8*   BILITOT 3.3* 3.5*   ALKPHOS 181* 153*         Assessment & Plan:   This is a very pleasant 44-year-old male with a history of morbid obesity (BMI 43) and alcohol abuse who presents with progressively worsening scrotal edema, abdominal distention, and lower extremity edema.  He has a longstanding history of heavy alcohol abuse.  CT scan imaging shows new diagnosis of cirrhosis.  Therefore we have been consulted for further evaluation.     Cirrhosis, noted on imaging.  History is

## 2025-05-21 NOTE — PLAN OF CARE
Problem: Discharge Planning  Goal: Discharge to home or other facility with appropriate resources  Outcome: Progressing     Problem: Pain  Goal: Verbalizes/displays adequate comfort level or baseline comfort level  Outcome: Progressing     Problem: Gastrointestinal - Adult  Goal: Minimal or absence of nausea and vomiting  Outcome: Progressing  Flowsheets (Taken 5/21/2025 0946)  Minimal or absence of nausea and vomiting:   Administer IV fluids as ordered to ensure adequate hydration   Maintain NPO status until nausea and vomiting are resolved   Nasogastric tube to low intermittent suction as ordered   Administer ordered antiemetic medications as needed   Provide nonpharmacologic comfort measures as appropriate   Advance diet as tolerated, if ordered   Nutrition consult to assist patient with adequate nutrition and appropriate food choices  Goal: Maintains or returns to baseline bowel function  Outcome: Progressing  Flowsheets (Taken 5/21/2025 0946)  Maintains or returns to baseline bowel function:   Assess bowel function   Encourage oral fluids to ensure adequate hydration   Administer IV fluids as ordered to ensure adequate hydration   Administer ordered medications as needed   Encourage mobilization and activity   Nutrition consult to assist patient with appropriate food choices  Goal: Maintains adequate nutritional intake  Outcome: Progressing  Flowsheets (Taken 5/21/2025 0946)  Maintains adequate nutritional intake:   Monitor percentage of each meal consumed   Identify factors contributing to decreased intake, treat as appropriate   Assist with meals as needed   Monitor intake and output, weight and lab values   Obtain nutritional consult as needed     Problem: Genitourinary - Adult  Goal: Absence of urinary retention  Outcome: Progressing  Flowsheets (Taken 5/21/2025 0946)  Absence of urinary retention:   Assess patient’s ability to void and empty bladder   Place urinary catheter per Licensed Independent

## 2025-05-21 NOTE — PROGRESS NOTES
Pharmacy Renal Adjustment    Hao Bradley is a 44 y.o. male. Pharmacy has renally adjusted medications per protocol.    Recent Labs     05/19/25 1950   BUN 9       Recent Labs     05/19/25 1950   CREATININE 0.6*       Estimated Creatinine Clearance: 254 mL/min (A) (based on SCr of 0.6 mg/dL (L)).    Height:   Ht Readings from Last 1 Encounters:   05/19/25 1.905 m (6' 3\")     Weight:  Wt Readings from Last 1 Encounters:   05/19/25 (!) 158.8 kg (350 lb)       Plan: Adjust the following medications based on renal function:           Cefepime to 2000 mg IV every 8 hours extended infusion over 240 minutes     Electronically signed by YAHIR TERRY RPH on 5/21/2025 at 1:54 AM

## 2025-05-21 NOTE — PROGRESS NOTES
Progress Note    Date:2025       Room:0324/324-01  Patient Name:Hao Bradley     YOB: 1980     Age:44 y.o.      Subjective    Subjective: 44-year-old male with a history of morbid obesity, alcohol use and denies history of alcohol abuse, presents to the hospital with concerns of scrotal swelling and anasarca.  On admission was noted to have significant abdominal ascites requiring paracentesis, and placed on Bumex drip.  CT of the abdomen and pelvis noted cirrhosis, splenomegaly, massive amount of fluid in the scrotum.  Pleural effusion, esophageal and splenic varices.  Was seen by gastroenterologist, initiated on spironolactone 50 mg daily, low-sodium diet with 2 L fluid restriction, viral hepatitis panel negative, right upper quadrant ultrasound obtained.  Ultrasound was negative for obstruction, vitamin K was given 10 mg p.o. for 3 days given coagulopathy with INR of 1.7.  Patient admitted in-house for decompensated cirrhosis currently improving volume wise however scrotal edema still persists.    Was seen in house today with spouse present, denied any acute overnight event.  Stated he is feeling better however scared of eating due to concerns of possible uncontrolled bowels.  Denied any dyspnea, nausea vomiting or abdominal pain.       Review of Systems: 12 point system review negative except as stated above.    Objective         Vitals Last 24 Hours:  TEMPERATURE:  Temp  Av.1 °F (36.7 °C)  Min: 98.1 °F (36.7 °C)  Max: 98.2 °F (36.8 °C)  RESPIRATIONS RANGE: Resp  Av  Min: 18  Max: 18  PULSE OXIMETRY RANGE: SpO2  Av %  Min: 92 %  Max: 97 %  PULSE RANGE: Pulse  Av  Min: 96  Max: 104  BLOOD PRESSURE RANGE: Systolic (24hrs), Av , Min:121 , Max:134   ; Diastolic (24hrs), Av, Min:74, Max:87    I/O (24Hr):    Intake/Output Summary (Last 24 hours) at 2025 1250  Last data filed at 2025 1213  Gross per 24 hour   Intake --   Output 38311 ml   Net -33105 ml  effusions right greater than left. 6. Esophageal and splenic varices.   All CT scans are performed using dose optimization techniques as appropriate to the performed exam and includes at least one of the following:  Automated exposure control, adjustment of the mA and/or kV according to size, and the use of iterative reconstruction technique.  All CT scans are performed using dose optimization techniques as appropriate to the performed exam and include at least one of the following: Automated exposure control, adjustment of the mA and/or kV according to size, and the use of iterative reconstruction technique.  ______________________________________ Electronically signed by: YINKA MOYA M.D. Date:     05/20/2025 Time:    01:01     US SCROTUM AND TESTICLES  Result Date: 5/19/2025  EXAM: SCROTAL/TESTICULAR ULTRASOUND  TECHNIQUE:  Gray scale and color Doppler sonographic imaging performed of the testicles and scrotum.  HISTORY:  Scrotal swelling.  Pain and erythema.  COMPARISON:  None.  FINDINGS:  The testicles appear to be symmetric in size and echogenicity. There is extensive scrotal soft tissue swelling with edema.  Interstitial fluid or similar echogenicity is present within the scrotal wall. Scrotal wall measures up to 4.5 cm. Small hydroceles bilaterally.  Some vascularity in the scrotal wall.  Epididymal flow appears symmetric.       No acute testicular pathology seen.  Extensive scrotal edema with some scrotal wall vascularity and small hydroceles.  Concerning for cellulitis   ______________________________________ Electronically signed by: YINKA MOYA M.D. Date:     05/19/2025 Time:    20:48     Assessment//Plan           Hospital Problems           Last Modified POA    * (Principal) Cellulitis of scrotum 5/20/2025 Yes    Genital edema, male 5/20/2025 Yes    Scrotal edema 5/20/2025 Yes    Cirrhosis of liver with ascites (HCC) 5/20/2025 Yes    Anasarca 5/20/2025 Yes     Assessment & Plan    Decompensated Liver

## 2025-05-21 NOTE — PLAN OF CARE
Problem: Discharge Planning  Goal: Discharge to home or other facility with appropriate resources  5/20/2025 2100 by Rogelio Mcgovern RN  Outcome: Progressing  5/20/2025 1813 by Madelin Moon LPN  Outcome: Progressing  Flowsheets (Taken 5/20/2025 1643)  Discharge to home or other facility with appropriate resources:   Identify barriers to discharge with patient and caregiver   Arrange for needed discharge resources and transportation as appropriate   Identify discharge learning needs (meds, wound care, etc)   Arrange for interpreters to assist at discharge as needed   Refer to discharge planning if patient needs post-hospital services based on physician order or complex needs related to functional status, cognitive ability or social support system     Problem: Pain  Goal: Verbalizes/displays adequate comfort level or baseline comfort level  Outcome: Progressing

## 2025-05-21 NOTE — PROGRESS NOTES
Urology Progress Note    SUBJECTIVE:  Currently in the shower. Patient denies pain. Wife reports improvement in penile edema, scrotal edema and scrotal erythema.     OBJECTIVE:   Review of Systems     Physical  VITALS:  /87   Pulse (!) 101   Temp 98.2 °F (36.8 °C) (Oral)   Resp 18   Ht 1.905 m (6' 3\")   Wt (!) 152 kg (335 lb 1.6 oz)   SpO2 93%   BMI 41.88 kg/m²   TEMPERATURE:  Current - Temp: 98.2 °F (36.8 °C); Max - Temp  Av.1 °F (36.7 °C)  Min: 98.1 °F (36.7 °C)  Max: 98.2 °F (36.8 °C)   24 HR I&O   Intake/Output Summary (Last 24 hours) at 2025 1629  Last data filed at 2025 1422  Gross per 24 hour   Intake --   Output 10359 ml   Net -03880 ml       Physical Exam   BACK: not done  ABDOMEN:  distended/obese  HEART:  normal rate  CHEST:  Normal respiratory effort   GENITAL/URINARY:  Cruz cath in place draining clear yellow urine. Significant UOP charted. Scrotal and penile edema improving, erythema improving.     Data  CBC:   Recent Labs     25  07   WBC 4.3* 4.2*   HGB 11.9* 11.5*   HCT 35.8* 33.9*   PLT 54* 53*     BMP:    Recent Labs     25  0723 25  1441   * 135*  --    K 3.4* 2.6* 2.9*    96*  --    CO2 21* 29  --    BUN 9 6  --    CREATININE 0.6* 0.6*  --    GLUCOSE 120* 98  --        No results for input(s): \"LABURIN\" in the last 72 hours.  Recent Labs     25   BC No Growth to date.  Any change in status will be called.     Recent Labs     25   BLOODCULT2 No Growth to date.  Any change in status will be called.         U/A:    Lab Results   Component Value Date/Time    COLORU ORANGE 2025 11:55 AM    PHUR 5.5 2025 11:55 AM    WBCUA 1 2025 11:55 AM    RBCUA 4 2025 11:55 AM    BACTERIA Negative 2025 11:55 AM    CLARITYU Clear 2025 11:55 AM    LEUKOCYTESUR SMALL 2025 11:55 AM    UROBILINOGEN 1.0 2025 11:55 AM    BILIRUBINUR MODERATE 2025 11:55 AM

## 2025-05-22 LAB
ALBUMIN SERPL-MCNC: 2.9 G/DL (ref 3.5–5.2)
ALP SERPL-CCNC: 142 U/L (ref 40–129)
ALT SERPL-CCNC: 33 U/L (ref 10–50)
ANION GAP SERPL CALCULATED.3IONS-SCNC: 10 MMOL/L (ref 8–16)
AST SERPL-CCNC: 79 U/L (ref 10–50)
BASOPHILS # BLD: 0 K/UL (ref 0–0.2)
BASOPHILS NFR BLD: 0.5 % (ref 0–1)
BILIRUB DIRECT SERPL-MCNC: 1.6 MG/DL (ref 0–0.3)
BILIRUB INDIRECT SERPL-MCNC: 1.7 MG/DL (ref 0–1)
BILIRUB SERPL-MCNC: 3.3 MG/DL (ref 0.2–1.2)
BUN SERPL-MCNC: 5 MG/DL (ref 6–20)
CALCIUM SERPL-MCNC: 7.5 MG/DL (ref 8.6–10)
CHLORIDE SERPL-SCNC: 94 MMOL/L (ref 98–107)
CO2 SERPL-SCNC: 29 MMOL/L (ref 22–29)
CREAT SERPL-MCNC: 0.7 MG/DL (ref 0.7–1.2)
EOSINOPHIL # BLD: 0.2 K/UL (ref 0–0.6)
EOSINOPHIL NFR BLD: 3.1 % (ref 0–5)
ERYTHROCYTE [DISTWIDTH] IN BLOOD BY AUTOMATED COUNT: 16.7 % (ref 11.5–14.5)
GLUCOSE SERPL-MCNC: 96 MG/DL (ref 70–99)
HCT VFR BLD AUTO: 32.6 % (ref 42–52)
HGB BLD-MCNC: 10.8 G/DL (ref 14–18)
IMM GRANULOCYTES # BLD: 0 K/UL
LYMPHOCYTES # BLD: 1.1 K/UL (ref 1.1–4.5)
LYMPHOCYTES NFR BLD: 19.6 % (ref 20–40)
MAGNESIUM SERPL-MCNC: 1.5 MG/DL (ref 1.6–2.6)
MCH RBC QN AUTO: 30.7 PG (ref 27–31)
MCHC RBC AUTO-ENTMCNC: 33.1 G/DL (ref 33–37)
MCV RBC AUTO: 92.6 FL (ref 80–94)
MONOCYTES # BLD: 0.6 K/UL (ref 0–0.9)
MONOCYTES NFR BLD: 10 % (ref 0–10)
NEUTROPHILS # BLD: 3.7 K/UL (ref 1.5–7.5)
NEUTS SEG NFR BLD: 66.4 % (ref 50–65)
PLATELET # BLD AUTO: 56 K/UL (ref 130–400)
PMV BLD AUTO: 11.4 FL (ref 9.4–12.4)
POTASSIUM SERPL-SCNC: 3.2 MMOL/L (ref 3.5–5)
PROT SERPL-MCNC: 7.3 G/DL (ref 6.4–8.3)
RBC # BLD AUTO: 3.52 M/UL (ref 4.7–6.1)
SODIUM SERPL-SCNC: 133 MMOL/L (ref 136–145)
WBC # BLD AUTO: 5.5 K/UL (ref 4.8–10.8)

## 2025-05-22 PROCEDURE — 6370000000 HC RX 637 (ALT 250 FOR IP): Performed by: HOSPITALIST

## 2025-05-22 PROCEDURE — 2500000003 HC RX 250 WO HCPCS: Performed by: HOSPITALIST

## 2025-05-22 PROCEDURE — 6370000000 HC RX 637 (ALT 250 FOR IP): Performed by: NURSE PRACTITIONER

## 2025-05-22 PROCEDURE — 6360000002 HC RX W HCPCS: Performed by: INTERNAL MEDICINE

## 2025-05-22 PROCEDURE — 85025 COMPLETE CBC W/AUTO DIFF WBC: CPT

## 2025-05-22 PROCEDURE — 6360000002 HC RX W HCPCS: Performed by: HOSPITALIST

## 2025-05-22 PROCEDURE — 99231 SBSQ HOSP IP/OBS SF/LOW 25: CPT | Performed by: NURSE PRACTITIONER

## 2025-05-22 PROCEDURE — 2580000003 HC RX 258: Performed by: INTERNAL MEDICINE

## 2025-05-22 PROCEDURE — 94760 N-INVAS EAR/PLS OXIMETRY 1: CPT

## 2025-05-22 PROCEDURE — 83735 ASSAY OF MAGNESIUM: CPT

## 2025-05-22 PROCEDURE — 80048 BASIC METABOLIC PNL TOTAL CA: CPT

## 2025-05-22 PROCEDURE — 80076 HEPATIC FUNCTION PANEL: CPT

## 2025-05-22 PROCEDURE — 36415 COLL VENOUS BLD VENIPUNCTURE: CPT

## 2025-05-22 PROCEDURE — 1200000000 HC SEMI PRIVATE

## 2025-05-22 PROCEDURE — P9047 ALBUMIN (HUMAN), 25%, 50ML: HCPCS | Performed by: INTERNAL MEDICINE

## 2025-05-22 PROCEDURE — 6370000000 HC RX 637 (ALT 250 FOR IP): Performed by: INTERNAL MEDICINE

## 2025-05-22 RX ORDER — BUMETANIDE 0.25 MG/ML
2 INJECTION, SOLUTION INTRAMUSCULAR; INTRAVENOUS 3 TIMES DAILY
Status: DISCONTINUED | OUTPATIENT
Start: 2025-05-22 | End: 2025-05-23

## 2025-05-22 RX ORDER — GUAIFENESIN 200 MG/10ML
LIQUID ORAL 2 TIMES DAILY
Status: DISCONTINUED | OUTPATIENT
Start: 2025-05-22 | End: 2025-05-24 | Stop reason: HOSPADM

## 2025-05-22 RX ADMIN — POTASSIUM CHLORIDE 40 MEQ: 1500 TABLET, EXTENDED RELEASE ORAL at 08:51

## 2025-05-22 RX ADMIN — WATER 2000 MG: 1 INJECTION INTRAMUSCULAR; INTRAVENOUS; SUBCUTANEOUS at 08:51

## 2025-05-22 RX ADMIN — ENOXAPARIN SODIUM 40 MG: 100 INJECTION SUBCUTANEOUS at 19:45

## 2025-05-22 RX ADMIN — POTASSIUM CHLORIDE 10 MEQ: 10 INJECTION, SOLUTION INTRAVENOUS at 00:32

## 2025-05-22 RX ADMIN — ALBUMIN (HUMAN) 25 G: 0.25 INJECTION, SOLUTION INTRAVENOUS at 19:45

## 2025-05-22 RX ADMIN — BUMETANIDE 1 MG/HR: 0.25 INJECTION INTRAMUSCULAR; INTRAVENOUS at 08:12

## 2025-05-22 RX ADMIN — WATER 2000 MG: 1 INJECTION INTRAMUSCULAR; INTRAVENOUS; SUBCUTANEOUS at 01:18

## 2025-05-22 RX ADMIN — BUMETANIDE 2 MG: 0.25 INJECTION INTRAMUSCULAR; INTRAVENOUS at 13:09

## 2025-05-22 RX ADMIN — MAGNESIUM SULFATE HEPTAHYDRATE 2000 MG: 40 INJECTION, SOLUTION INTRAVENOUS at 05:28

## 2025-05-22 RX ADMIN — SODIUM CHLORIDE, PRESERVATIVE FREE 10 ML: 5 INJECTION INTRAVENOUS at 08:56

## 2025-05-22 RX ADMIN — POTASSIUM CHLORIDE 40 MEQ: 1500 TABLET, EXTENDED RELEASE ORAL at 05:27

## 2025-05-22 RX ADMIN — METHOCARBAMOL 500 MG: 500 TABLET ORAL at 19:45

## 2025-05-22 RX ADMIN — SODIUM CHLORIDE, PRESERVATIVE FREE 10 ML: 5 INJECTION INTRAVENOUS at 19:53

## 2025-05-22 RX ADMIN — POTASSIUM CHLORIDE 40 MEQ: 1500 TABLET, EXTENDED RELEASE ORAL at 13:09

## 2025-05-22 RX ADMIN — BUMETANIDE 2 MG: 0.25 INJECTION INTRAMUSCULAR; INTRAVENOUS at 18:10

## 2025-05-22 RX ADMIN — POTASSIUM CHLORIDE 40 MEQ: 1500 TABLET, EXTENDED RELEASE ORAL at 19:45

## 2025-05-22 RX ADMIN — PHYTONADIONE 10 MG: 5 TABLET ORAL at 08:51

## 2025-05-22 RX ADMIN — ALBUMIN (HUMAN) 25 G: 0.25 INJECTION, SOLUTION INTRAVENOUS at 08:45

## 2025-05-22 RX ADMIN — METHOCARBAMOL 500 MG: 500 TABLET ORAL at 08:51

## 2025-05-22 RX ADMIN — ENOXAPARIN SODIUM 40 MG: 100 INJECTION SUBCUTANEOUS at 08:50

## 2025-05-22 RX ADMIN — SPIRONOLACTONE 50 MG: 50 TABLET ORAL at 08:51

## 2025-05-22 RX ADMIN — Medication: at 08:55

## 2025-05-22 RX ADMIN — WATER 2000 MG: 1 INJECTION INTRAMUSCULAR; INTRAVENOUS; SUBCUTANEOUS at 18:10

## 2025-05-22 RX ADMIN — BUMETANIDE 2 MG: 0.25 INJECTION INTRAMUSCULAR; INTRAVENOUS at 08:50

## 2025-05-22 RX ADMIN — Medication: at 19:53

## 2025-05-22 NOTE — CONSULTS
Nutrition Assessment     Type and Reason for Visit: Initial, Consult    Nutrition Recommendations/Plan:   Follow for questions re: diet education     Malnutrition Assessment:  Malnutrition Status: At risk for malnutrition    Nutrition Assessment:  Received consult for diet education for 2 gm Na+ fluid restriction.  Pt and family present.  Educational material given with explanation.  Lots of good questions offered.  Pt is not a big salt user normally.  Does have high sodium foods though on a regular basis.  Discussed ways to modify, substitute, or avoid those items.  Discussed other ways to flavor foods instead of salt.  All very receptive and interested in obtaining information.  Will follow for further questions    Estimated Daily Nutrient Needs:  Energy (kcal):  9341-4628 kcals (8-15 kcals/kg) Weight Used for Energy Requirements: Current     Protein (g):  133-178g Weight Used for Protein Requirements: Ideal        Fluid (ml/day):  1208-2000ml Method Used for Fluid Requirements: 1 ml/kcal    Nutrition Related Findings:   +3 perineal and BLE edema Wound Type: None    Current Nutrition Therapies:    ADULT DIET; Regular; Low Sodium (2 gm); 2000 ml    Anthropometric Measures:  Height: 190.5 cm (6' 3\")  Current Body Wt: 151 kg (332 lb 14.3 oz)   BMI: 41.6        Nutrition Diagnosis:   Food & nutrition-related knowledge deficit related to endocrine dysfunction as evidenced by localized or generalized fluid accumulation    Nutrition Interventions:   Food and/or Nutrient Delivery: Continue Current Diet  Nutrition Education/Counseling: Education/Counseling completed  Coordination of Nutrition Care: Continue to monitor while inpatient  Plan of Care discussed with: pt, wife, family    Goals:  Goals: Meet at least 75% of estimated needs, PO intake 50% or greater, Maintain adequate nutrition status, Teach back diet education  Type of Goal: New goal  Previous Goal Met: New Goal    Nutrition Monitoring and Evaluation:    Behavioral-Environmental Outcomes: None Identified  Food/Nutrient Intake Outcomes: Food and Nutrient Intake  Physical Signs/Symptoms Outcomes: Biochemical Data, Fluid Status or Edema, Weight    Discharge Planning:    Continue current diet     Adri Deshpande MS, RD, LD  Contact: [phone]

## 2025-05-22 NOTE — PROGRESS NOTES
Urology Progress Note      SUBJECTIVE:  Sitting up in the chair, no new complaints.     OBJECTIVE:   Review of Systems     Physical  VITALS:  BP (!) 128/90   Pulse (!) 110   Temp 96.8 °F (36 °C) (Oral)   Resp 18   Ht 1.905 m (6' 3\")   Wt (!) 151 kg (332 lb 14.3 oz)   SpO2 92%   BMI 41.61 kg/m²   TEMPERATURE:  Current - Temp: 96.8 °F (36 °C); Max - Temp  Av.1 °F (36.7 °C)  Min: 96.8 °F (36 °C)  Max: 99.1 °F (37.3 °C)   24 HR I&O   Intake/Output Summary (Last 24 hours) at 2025 0806  Last data filed at 2025 0556  Gross per 24 hour   Intake --   Output 8375 ml   Net -8375 ml       Physical Exam   BACK: inspection of back is normal and no tenderness in spine or flanks  ABDOMEN:  soft, distended, and non-tender. Pitting edema in bilateral lower ext up to abdomen   HEART:  normal rate  CHEST: Normal respiratory effort   GENITAL/URINARY:  mcclain cath in place draining clear yellow urine. Scrotum and penile edema remains however somewhat improved. Erythema present no fluctuation. Scrotal wall nontender. Epidermal lysis without ulcerations.     Data  CBC:   Recent Labs     259   WBC 4.3* 4.2* 5.5   HGB 11.9* 11.5* 10.8*   HCT 35.8* 33.9* 32.6*   PLT 54* 53* 56*     BMP:    Recent Labs     25  1441 25  0429   * 135*  --  133*   K 3.4* 2.6* 2.9* 3.2*    96*  --  94*   CO2 21* 29  --  29   BUN 9 6  --  5*   CREATININE 0.6* 0.6*  --  0.7   GLUCOSE 120* 98  --  96       No results for input(s): \"LABURIN\" in the last 72 hours.  Recent Labs     25   BC No Growth to date.  Any change in status will be called.     Recent Labs     25   BLOODCULT2 No Growth to date.  Any change in status will be called.         U/A:    Lab Results   Component Value Date/Time    COLORU ORANGE 2025 11:55 AM    PHUR 5.5 2025 11:55 AM    WBCUA 1 2025 11:55 AM    RBCUA 4 2025 11:55 AM    BACTERIA

## 2025-05-22 NOTE — PROGRESS NOTES
Progress Note    Date:2025       Room:0324/324-01  Patient Name:Hao Bradley     YOB: 1980     Age:44 y.o.      Subjective    Subjective: 44-year-old male with a history of morbid obesity, alcohol use and denies history of alcohol abuse, presents to the hospital with concerns of scrotal swelling and anasarca.  On admission was noted to have significant abdominal ascites requiring paracentesis, and placed on Bumex drip. CT of the abdomen and pelvis noted cirrhosis, splenomegaly, massive amount of fluid in the scrotum.  Pleural effusion, esophageal and splenic varices.  Was seen by gastroenterologist, initiated on spironolactone 50 mg daily, low-sodium diet with 2 L fluid restriction, viral hepatitis panel negative, right upper quadrant ultrasound obtained.  Ultrasound was negative for obstruction, vitamin K was given 10 mg p.o. for 3 days given coagulopathy with INR of 1.7. Patient admitted in-house for decompensated cirrhosis currently improving volume wise however scrotal edema still persists and will likely take time to resolve.    Was seen in house today with spouse present, denied any acute overnight event.  Feeling better, sitting at edge of bed. Bumex ggt transitioned to IV pushes. Cruz removed. Oral intake improved, denied any dyspnea, nausea vomiting or abdominal pain. Encouraged continued ambulation.       Review of Systems: 12 point system review negative except as stated above.    Objective         Vitals Last 24 Hours:  TEMPERATURE:  Temp  Av.1 °F (36.7 °C)  Min: 96.8 °F (36 °C)  Max: 99.1 °F (37.3 °C)  RESPIRATIONS RANGE: Resp  Av.5  Min: 16  Max: 20  PULSE OXIMETRY RANGE: SpO2  Av.4 %  Min: 91 %  Max: 93 %  PULSE RANGE: Pulse  Av.3  Min: 90  Max: 110  BLOOD PRESSURE RANGE: Systolic (24hrs), Av , Min:121 , Max:155   ; Diastolic (24hrs), Av, Min:73, Max:90    I/O (24Hr):    Intake/Output Summary (Last 24 hours) at 2025 1052  Last data filed at  5/22/2025 0957  Gross per 24 hour   Intake 2478.36 ml   Output 7825 ml   Net -5346.64 ml       Physical Examination:  General: Well-developed, no acute distress lying comfortably in bed.  HEENT: Atraumatic normocephalic, range of motion normal, no JVD, no tracheal deviation noted.  Cardiac: Normal S1-S2 no murmurs   Respiratory: clear To auscultation bilaterally, no rhonchi or rales, no wheezing  Abdomen: Truncal Obesity, Soft, positive bowel sounds in all quadrants, no distention, nontender to palpation, no rebound noted.    Extremities: no tenderness, moves all extremities  : Scrotal Edema  Psych: Affect normal and good eye contact, behavioral normal.      Labs/Imaging/Diagnostics    Labs:  CBC:  Recent Labs     05/19/25  1950 05/21/25 0723 05/22/25 0429   WBC 4.3* 4.2* 5.5   RBC 3.85* 3.77* 3.52*   HGB 11.9* 11.5* 10.8*   HCT 35.8* 33.9* 32.6*   MCV 93.0 89.9 92.6   RDW 16.9* 17.0* 16.7*   PLT 54* 53* 56*     CHEMISTRIES:  Recent Labs     05/19/25  1950 05/21/25 0723 05/21/25  1441 05/22/25  0429   * 135*  --  133*   K 3.4* 2.6* 2.9* 3.2*    96*  --  94*   CO2 21* 29  --  29   BUN 9 6  --  5*   CREATININE 0.6* 0.6*  --  0.7   GLUCOSE 120* 98  --  96   MG  --  1.3* 1.9 1.5*     PT/INR:  Recent Labs     05/20/25  1203   PROTIME 19.9*   INR 1.71*     APTT:No results for input(s): \"APTT\" in the last 72 hours.  LIVER PROFILE:  Recent Labs     05/19/25  1950 05/21/25 0723 05/22/25 0429   AST 90* 84* 79*   ALT 43 39 33   BILIDIR  --  1.8* 1.6*   BILITOT 3.3* 3.5* 3.3*   ALKPHOS 181* 153* 142*       Imaging Last 24 Hours:  US LIVER  Result Date: 5/21/2025  EXAM: US LIVER  HISTORY: Decompensated liver disease.  Rule out vascular or biliary obstruction.  Add Doppler.  TECHNIQUE: Sonography of the right upper quadrant of the abdomen was performed.  Color Doppler imaging and spectral Doppler imaging of the portal vein was also performed.  Images were obtained and stored in a permanent archive.  COMPARISON:

## 2025-05-22 NOTE — PLAN OF CARE
Problem: Discharge Planning  Goal: Discharge to home or other facility with appropriate resources  5/22/2025 0046 by Luana Roberts, RN  Outcome: Progressing  5/21/2025 1448 by Madelin oMon LPN  Outcome: Progressing     Problem: Pain  Goal: Verbalizes/displays adequate comfort level or baseline comfort level  5/22/2025 0046 by Luana Roberts, RN  Outcome: Progressing  5/21/2025 1448 by Madelin Moon LPN  Outcome: Progressing     Problem: Gastrointestinal - Adult  Goal: Minimal or absence of nausea and vomiting  5/22/2025 0046 by Luana Roberts, RN  Outcome: Progressing  5/21/2025 1448 by Madelin Moon LPN  Outcome: Progressing  Flowsheets (Taken 5/21/2025 0946)  Minimal or absence of nausea and vomiting:   Administer IV fluids as ordered to ensure adequate hydration   Maintain NPO status until nausea and vomiting are resolved   Nasogastric tube to low intermittent suction as ordered   Administer ordered antiemetic medications as needed   Provide nonpharmacologic comfort measures as appropriate   Advance diet as tolerated, if ordered   Nutrition consult to assist patient with adequate nutrition and appropriate food choices  Goal: Maintains or returns to baseline bowel function  5/22/2025 0046 by Luana Roberts RN  Outcome: Progressing  5/21/2025 1448 by Madelin Moon LPN  Outcome: Progressing  Flowsheets (Taken 5/21/2025 0946)  Maintains or returns to baseline bowel function:   Assess bowel function   Encourage oral fluids to ensure adequate hydration   Administer IV fluids as ordered to ensure adequate hydration   Administer ordered medications as needed   Encourage mobilization and activity   Nutrition consult to assist patient with appropriate food choices  Goal: Maintains adequate nutritional intake  5/22/2025 0046 by Luana Roberts, RN  Outcome: Progressing  5/21/2025 1448 by Madelin Moon LPN  Outcome: Progressing  Flowsheets (Taken 5/21/2025

## 2025-05-22 NOTE — PLAN OF CARE
Problem: Discharge Planning  Goal: Discharge to home or other facility with appropriate resources  5/22/2025 1133 by Madelin Moon LPN  Outcome: Progressing  Flowsheets (Taken 5/22/2025 0957)  Discharge to home or other facility with appropriate resources:   Identify barriers to discharge with patient and caregiver   Arrange for needed discharge resources and transportation as appropriate   Identify discharge learning needs (meds, wound care, etc)   Arrange for interpreters to assist at discharge as needed   Refer to discharge planning if patient needs post-hospital services based on physician order or complex needs related to functional status, cognitive ability or social support system  5/22/2025 0046 by Luana Roberts, RN  Outcome: Progressing     Problem: Pain  Goal: Verbalizes/displays adequate comfort level or baseline comfort level  5/22/2025 1133 by Madelin Moon LPN  Outcome: Progressing  5/22/2025 0046 by Luana Roberts, RN  Outcome: Progressing     Problem: Gastrointestinal - Adult  Goal: Minimal or absence of nausea and vomiting  5/22/2025 1133 by Madelin Moon LPN  Outcome: Progressing  Flowsheets (Taken 5/22/2025 0957)  Minimal or absence of nausea and vomiting:   Administer IV fluids as ordered to ensure adequate hydration   Maintain NPO status until nausea and vomiting are resolved   Nasogastric tube to low intermittent suction as ordered   Administer ordered antiemetic medications as needed   Provide nonpharmacologic comfort measures as appropriate   Nutrition consult to assist patient with adequate nutrition and appropriate food choices   Advance diet as tolerated, if ordered  5/22/2025 0046 by Luana Roberts, RN  Outcome: Progressing  Goal: Maintains or returns to baseline bowel function  5/22/2025 1133 by Madelin Moon LPN  Outcome: Progressing  Flowsheets (Taken 5/22/2025 0957)  Maintains or returns to baseline bowel function:   Assess  bowel function   Encourage oral fluids to ensure adequate hydration   Administer IV fluids as ordered to ensure adequate hydration   Administer ordered medications as needed   Encourage mobilization and activity   Nutrition consult to assist patient with appropriate food choices  5/22/2025 0046 by Luana Roberts, RN  Outcome: Progressing  Goal: Maintains adequate nutritional intake  5/22/2025 1133 by Madelin Moon LPN  Outcome: Progressing  Flowsheets (Taken 5/22/2025 0957)  Maintains adequate nutritional intake:   Monitor percentage of each meal consumed   Identify factors contributing to decreased intake, treat as appropriate   Obtain nutritional consult as needed   Monitor intake and output, weight and lab values   Assist with meals as needed  5/22/2025 0046 by Luana Roberts, RN  Outcome: Progressing     Problem: Genitourinary - Adult  Goal: Absence of urinary retention  5/22/2025 1133 by Madelin Moon LPN  Outcome: Progressing  Flowsheets (Taken 5/22/2025 0957)  Absence of urinary retention:   Assess patient’s ability to void and empty bladder   Monitor intake/output and perform bladder scan as needed   Place urinary catheter per Licensed Independent Practitioner order if needed   Discuss with Licensed Independent Practitioner  medications to alleviate retention as needed   Discuss catheterization for long term situations as appropriate  5/22/2025 0046 by Luana Roberts, RN  Outcome: Progressing     Problem: Safety - Adult  Goal: Free from fall injury  Outcome: Progressing

## 2025-05-22 NOTE — PROGRESS NOTES
Nutrition Education    Educated on low sodium fluid restriction  Learners: Patient, Family, and Significant Other  Readiness: Eager  Method: Explanation, Demonstration, and Handout  Response: Verbalizes Understanding  Contact name and number provided.    Adri Deshpande MS, RD, LD  Contact Number: [phone]

## 2025-05-23 LAB
ALBUMIN SERPL-MCNC: 2.8 G/DL (ref 3.5–5.2)
ALP SERPL-CCNC: 143 U/L (ref 40–129)
ALT SERPL-CCNC: 29 U/L (ref 10–50)
ANION GAP SERPL CALCULATED.3IONS-SCNC: 9 MMOL/L (ref 8–16)
AST SERPL-CCNC: 81 U/L (ref 10–50)
BASOPHILS # BLD: 0.1 K/UL (ref 0–0.2)
BASOPHILS NFR BLD: 1.2 % (ref 0–1)
BILIRUB DIRECT SERPL-MCNC: 1.3 MG/DL (ref 0–0.3)
BILIRUB INDIRECT SERPL-MCNC: 1 MG/DL (ref 0–1)
BILIRUB SERPL-MCNC: 2.3 MG/DL (ref 0.2–1.2)
BUN SERPL-MCNC: 8 MG/DL (ref 6–20)
CALCIUM SERPL-MCNC: 7.5 MG/DL (ref 8.6–10)
CHLORIDE SERPL-SCNC: 97 MMOL/L (ref 98–107)
CO2 SERPL-SCNC: 27 MMOL/L (ref 22–29)
CREAT SERPL-MCNC: 0.7 MG/DL (ref 0.7–1.2)
EOSINOPHIL # BLD: 0.3 K/UL (ref 0–0.6)
EOSINOPHIL NFR BLD: 6.4 % (ref 0–5)
ERYTHROCYTE [DISTWIDTH] IN BLOOD BY AUTOMATED COUNT: 16.4 % (ref 11.5–14.5)
GLUCOSE SERPL-MCNC: 91 MG/DL (ref 70–99)
HCT VFR BLD AUTO: 29.7 % (ref 42–52)
HGB BLD-MCNC: 10.7 G/DL (ref 14–18)
IMM GRANULOCYTES # BLD: 0 K/UL
LYMPHOCYTES # BLD: 1 K/UL (ref 1.1–4.5)
LYMPHOCYTES NFR BLD: 23.6 % (ref 20–40)
MAGNESIUM SERPL-MCNC: 1.6 MG/DL (ref 1.6–2.6)
MCH RBC QN AUTO: 32.8 PG (ref 27–31)
MCHC RBC AUTO-ENTMCNC: 36 G/DL (ref 33–37)
MCV RBC AUTO: 91.1 FL (ref 80–94)
MONOCYTES # BLD: 0.5 K/UL (ref 0–0.9)
MONOCYTES NFR BLD: 12.1 % (ref 0–10)
NEUTROPHILS # BLD: 2.4 K/UL (ref 1.5–7.5)
NEUTS SEG NFR BLD: 56.5 % (ref 50–65)
PLATELET # BLD AUTO: 45 K/UL (ref 130–400)
PMV BLD AUTO: 11.6 FL (ref 9.4–12.4)
POTASSIUM SERPL-SCNC: 3.4 MMOL/L (ref 3.5–5)
PROT SERPL-MCNC: 6.7 G/DL (ref 6.4–8.3)
RBC # BLD AUTO: 3.26 M/UL (ref 4.7–6.1)
SODIUM SERPL-SCNC: 133 MMOL/L (ref 136–145)
WBC # BLD AUTO: 4.2 K/UL (ref 4.8–10.8)

## 2025-05-23 PROCEDURE — 1200000000 HC SEMI PRIVATE

## 2025-05-23 PROCEDURE — 6360000002 HC RX W HCPCS: Performed by: HOSPITALIST

## 2025-05-23 PROCEDURE — P9047 ALBUMIN (HUMAN), 25%, 50ML: HCPCS | Performed by: INTERNAL MEDICINE

## 2025-05-23 PROCEDURE — 83735 ASSAY OF MAGNESIUM: CPT

## 2025-05-23 PROCEDURE — 2500000003 HC RX 250 WO HCPCS: Performed by: HOSPITALIST

## 2025-05-23 PROCEDURE — 6370000000 HC RX 637 (ALT 250 FOR IP): Performed by: INTERNAL MEDICINE

## 2025-05-23 PROCEDURE — 6360000002 HC RX W HCPCS: Performed by: INTERNAL MEDICINE

## 2025-05-23 PROCEDURE — 94760 N-INVAS EAR/PLS OXIMETRY 1: CPT

## 2025-05-23 PROCEDURE — 80048 BASIC METABOLIC PNL TOTAL CA: CPT

## 2025-05-23 PROCEDURE — 80076 HEPATIC FUNCTION PANEL: CPT

## 2025-05-23 PROCEDURE — 85025 COMPLETE CBC W/AUTO DIFF WBC: CPT

## 2025-05-23 PROCEDURE — 99221 1ST HOSP IP/OBS SF/LOW 40: CPT | Performed by: UROLOGY

## 2025-05-23 PROCEDURE — 36415 COLL VENOUS BLD VENIPUNCTURE: CPT

## 2025-05-23 PROCEDURE — 6370000000 HC RX 637 (ALT 250 FOR IP): Performed by: HOSPITALIST

## 2025-05-23 RX ORDER — BUMETANIDE 1 MG/1
2 TABLET ORAL 2 TIMES DAILY
Status: DISCONTINUED | OUTPATIENT
Start: 2025-05-23 | End: 2025-05-24 | Stop reason: HOSPADM

## 2025-05-23 RX ORDER — PROPRANOLOL HYDROCHLORIDE 10 MG/1
10 TABLET ORAL 3 TIMES DAILY
Qty: 90 TABLET | Refills: 3 | Status: SHIPPED | OUTPATIENT
Start: 2025-05-23

## 2025-05-23 RX ORDER — BUMETANIDE 2 MG/1
2 TABLET ORAL 2 TIMES DAILY
Qty: 60 TABLET | Refills: 3 | Status: SHIPPED | OUTPATIENT
Start: 2025-05-23

## 2025-05-23 RX ORDER — PROPRANOLOL HYDROCHLORIDE 10 MG/1
10 TABLET ORAL 3 TIMES DAILY
Status: DISCONTINUED | OUTPATIENT
Start: 2025-05-23 | End: 2025-05-24 | Stop reason: HOSPADM

## 2025-05-23 RX ORDER — POTASSIUM CHLORIDE 1500 MG/1
20 TABLET, EXTENDED RELEASE ORAL 2 TIMES DAILY
Qty: 60 TABLET | Refills: 3 | Status: SHIPPED | OUTPATIENT
Start: 2025-05-23

## 2025-05-23 RX ORDER — SPIRONOLACTONE 50 MG/1
50 TABLET, FILM COATED ORAL DAILY
Qty: 30 TABLET | Refills: 3 | Status: SHIPPED | OUTPATIENT
Start: 2025-05-24

## 2025-05-23 RX ADMIN — WATER 2000 MG: 1 INJECTION INTRAMUSCULAR; INTRAVENOUS; SUBCUTANEOUS at 17:32

## 2025-05-23 RX ADMIN — BUMETANIDE 2 MG: 1 TABLET ORAL at 17:32

## 2025-05-23 RX ADMIN — PROPRANOLOL HYDROCHLORIDE 10 MG: 10 TABLET ORAL at 08:40

## 2025-05-23 RX ADMIN — ENOXAPARIN SODIUM 40 MG: 100 INJECTION SUBCUTANEOUS at 08:26

## 2025-05-23 RX ADMIN — ALBUMIN (HUMAN) 25 G: 0.25 INJECTION, SOLUTION INTRAVENOUS at 08:37

## 2025-05-23 RX ADMIN — Medication: at 08:29

## 2025-05-23 RX ADMIN — SODIUM CHLORIDE, PRESERVATIVE FREE 10 ML: 5 INJECTION INTRAVENOUS at 08:42

## 2025-05-23 RX ADMIN — SODIUM CHLORIDE, PRESERVATIVE FREE 10 ML: 5 INJECTION INTRAVENOUS at 19:26

## 2025-05-23 RX ADMIN — PROPRANOLOL HYDROCHLORIDE 10 MG: 10 TABLET ORAL at 19:26

## 2025-05-23 RX ADMIN — POTASSIUM CHLORIDE 40 MEQ: 1500 TABLET, EXTENDED RELEASE ORAL at 08:26

## 2025-05-23 RX ADMIN — WATER 2000 MG: 1 INJECTION INTRAMUSCULAR; INTRAVENOUS; SUBCUTANEOUS at 01:27

## 2025-05-23 RX ADMIN — SPIRONOLACTONE 50 MG: 50 TABLET ORAL at 08:26

## 2025-05-23 RX ADMIN — POTASSIUM CHLORIDE 40 MEQ: 1500 TABLET, EXTENDED RELEASE ORAL at 14:52

## 2025-05-23 RX ADMIN — METHOCARBAMOL 500 MG: 500 TABLET ORAL at 19:26

## 2025-05-23 RX ADMIN — BUMETANIDE 2 MG: 1 TABLET ORAL at 08:40

## 2025-05-23 RX ADMIN — POTASSIUM CHLORIDE 40 MEQ: 1500 TABLET, EXTENDED RELEASE ORAL at 19:25

## 2025-05-23 RX ADMIN — WATER 2000 MG: 1 INJECTION INTRAMUSCULAR; INTRAVENOUS; SUBCUTANEOUS at 10:07

## 2025-05-23 RX ADMIN — Medication: at 19:27

## 2025-05-23 RX ADMIN — METHOCARBAMOL 500 MG: 500 TABLET ORAL at 08:26

## 2025-05-23 RX ADMIN — PROPRANOLOL HYDROCHLORIDE 10 MG: 10 TABLET ORAL at 14:52

## 2025-05-23 NOTE — PROGRESS NOTES
Nutrition Assessment     Type and Reason for Visit: Reassess    Nutrition Recommendations/Plan:   Provided phone number for questions that may arise once home     Malnutrition Assessment:  Malnutrition Status: At risk for malnutrition    Nutrition Assessment:  Following for further questions re: diet education given yesterday.  Few more questions answered.  Pt and wife appear to have a good understanding.  New weight not available.  Na+ level remains the same. K+ level has increased just a bit.    Estimated Daily Nutrient Needs:  Energy (kcal):  0267-1650 kcals (8-15 kcals/kg) Weight Used for Energy Requirements: Current     Protein (g):  133-178g Weight Used for Protein Requirements: Ideal        Fluid (ml/day):  1208-2000ml Method Used for Fluid Requirements: 1 ml/kcal    Nutrition Related Findings:   Pitting perineal & BLE edema Wound Type: None    Current Nutrition Therapies:    ADULT DIET; Regular; Low Sodium (2 gm); 2000 ml    Anthropometric Measures:  Height: 190.5 cm (6' 3\")  Current Body Wt: 151 kg (332 lb 14.3 oz)   BMI: 41.6        Nutrition Diagnosis:   Food & nutrition-related knowledge deficit related to endocrine dysfunction as evidenced by localized or generalized fluid accumulation    Nutrition Interventions:   Food and/or Nutrient Delivery: Continue Current Diet  Nutrition Education/Counseling: Education/Counseling completed  Coordination of Nutrition Care: Continue to monitor while inpatient  Plan of Care discussed with: pt and wife    Goals:  Goals: Meet at least 75% of estimated needs, PO intake 50% or greater, Maintain adequate nutrition status, Teach back diet education  Type of Goal: Continue current goal  Previous Goal Met: Goal(s) Achieved    Nutrition Monitoring and Evaluation:   Behavioral-Environmental Outcomes: None Identified  Food/Nutrient Intake Outcomes: Food and Nutrient Intake  Physical Signs/Symptoms Outcomes: Biochemical Data, Fluid Status or Edema, Weight    Discharge Planning:

## 2025-05-23 NOTE — PROGRESS NOTES
Progress Note    Date:2025       Room:0324/324-01  Patient Name:Hao Bradley     YOB: 1980     Age:44 y.o.      Subjective    Subjective: 44-year-old male with a history of morbid obesity, alcohol use and denies history of alcohol abuse, presents to the hospital with concerns of scrotal swelling and anasarca.  On admission was noted to have significant abdominal ascites requiring paracentesis, and placed on Bumex drip. CT of the abdomen and pelvis noted cirrhosis, splenomegaly, massive amount of fluid in the scrotum.  Pleural effusion, esophageal and splenic varices.  Was seen by gastroenterologist, initiated on spironolactone 50 mg daily, low-sodium diet with 2 L fluid restriction, viral hepatitis panel negative, right upper quadrant ultrasound obtained.  Ultrasound was negative for obstruction, vitamin K was given 10 mg p.o. for 3 days given coagulopathy with INR of 1.7. Patient admitted in-house for decompensated cirrhosis currently improved volume wise however scrotal edema still persists and will likely take time to resolve.    Was seen in house today with spouse present, denied any acute overnight event.  Feeling better, sitting at edge of bed. Bumex IV transitioned to oral. Denied any dyspnea, nausea vomiting or abdominal pain. Encouraged continued ambulation, plans for discharge home tomorrow.       Review of Systems: 12 point system review negative except as stated above.    Objective         Vitals Last 24 Hours:  TEMPERATURE:  Temp  Av.4 °F (36.9 °C)  Min: 97.9 °F (36.6 °C)  Max: 99.1 °F (37.3 °C)  RESPIRATIONS RANGE: Resp  Av.5  Min: 18  Max: 20  PULSE OXIMETRY RANGE: SpO2  Av.3 %  Min: 93 %  Max: 99 %  PULSE RANGE: Pulse  Av.3  Min: 87  Max: 101  BLOOD PRESSURE RANGE: Systolic (24hrs), Av , Min:118 , Max:131   ; Diastolic (24hrs), Av, Min:70, Max:92    I/O (24Hr):    Intake/Output Summary (Last 24 hours) at 2025 0951  Last data filed at 2025  and scrotum..  Bones: No aggressive lesion identified.No acute fracture.       1. Cirrhosis 2. Splenomegaly 3. Ascites 4. Massive amount of fluid within the scrotum which may be either large hydroceles or communication of ascites via the inguinal canals. 5. Pleural effusions right greater than left. 6. Esophageal and splenic varices.   All CT scans are performed using dose optimization techniques as appropriate to the performed exam and includes at least one of the following:  Automated exposure control, adjustment of the mA and/or kV according to size, and the use of iterative reconstruction technique.  All CT scans are performed using dose optimization techniques as appropriate to the performed exam and include at least one of the following: Automated exposure control, adjustment of the mA and/or kV according to size, and the use of iterative reconstruction technique.  ______________________________________ Electronically signed by: YINKA MOYA M.D. Date:     05/20/2025 Time:    01:01     US SCROTUM AND TESTICLES  Result Date: 5/19/2025  EXAM: SCROTAL/TESTICULAR ULTRASOUND  TECHNIQUE:  Gray scale and color Doppler sonographic imaging performed of the testicles and scrotum.  HISTORY:  Scrotal swelling.  Pain and erythema.  COMPARISON:  None.  FINDINGS:  The testicles appear to be symmetric in size and echogenicity. There is extensive scrotal soft tissue swelling with edema.  Interstitial fluid or similar echogenicity is present within the scrotal wall. Scrotal wall measures up to 4.5 cm. Small hydroceles bilaterally.  Some vascularity in the scrotal wall.  Epididymal flow appears symmetric.       No acute testicular pathology seen.  Extensive scrotal edema with some scrotal wall vascularity and small hydroceles.  Concerning for cellulitis   ______________________________________ Electronically signed by: YINKA MOYA M.D. Date:     05/19/2025 Time:    20:48     Assessment//Plan           Hospital Problems

## 2025-05-23 NOTE — PLAN OF CARE
Problem: Pain  Goal: Verbalizes/displays adequate comfort level or baseline comfort level  5/22/2025 2346 by Jami Carmona LPN  Outcome: Progressing  5/22/2025 1133 by Madelin Moon LPN  Outcome: Progressing     Problem: Discharge Planning  Goal: Discharge to home or other facility with appropriate resources  Recent Flowsheet Documentation  Taken 5/22/2025 1945 by Jami Carmona LPN  Discharge to home or other facility with appropriate resources:   Identify barriers to discharge with patient and caregiver   Refer to discharge planning if patient needs post-hospital services based on physician order or complex needs related to functional status, cognitive ability or social support system  5/22/2025 1133 by Madelin Moon LPN  Outcome: Progressing  Flowsheets (Taken 5/22/2025 0957)  Discharge to home or other facility with appropriate resources:   Identify barriers to discharge with patient and caregiver   Arrange for needed discharge resources and transportation as appropriate   Identify discharge learning needs (meds, wound care, etc)   Arrange for interpreters to assist at discharge as needed   Refer to discharge planning if patient needs post-hospital services based on physician order or complex needs related to functional status, cognitive ability or social support system     Problem: Safety - Adult  Goal: Free from fall injury  5/22/2025 2346 by Jami Carmona LPN  Outcome: Progressing  Flowsheets (Taken 5/22/2025 2345)  Free From Fall Injury: Instruct family/caregiver on patient safety  5/22/2025 1133 by Madelin Moon LPN  Outcome: Progressing

## 2025-05-23 NOTE — PROGRESS NOTES
Hao Bradley is a 44 y.o. male patient.    Current Facility-Administered Medications   Medication Dose Route Frequency Provider Last Rate Last Admin    bumetanide (BUMEX) tablet 2 mg  2 mg Oral BID Corina Taylor MD   2 mg at 05/23/25 0840    propranolol (INDERAL) tablet 10 mg  10 mg Oral TID Corina Taylor MD   10 mg at 05/23/25 0840    Hydrocerin (EUCERIN) cream CREA   Topical BID Sangeetha Bueno APRN - CNP   Given at 05/23/25 0829    methocarbamol (ROBAXIN) tablet 500 mg  500 mg Oral BID Corina Taylor MD   500 mg at 05/23/25 0826    ceFAZolin (ANCEF) 2,000 mg in sterile water 20 mL IV syringe  2,000 mg IntraVENous q8h Corina Taylor MD   2,000 mg at 05/23/25 0127    potassium chloride (KLOR-CON M) extended release tablet 40 mEq  40 mEq Oral TID Corina Taylor MD   40 mEq at 05/23/25 0826    sodium chloride flush 0.9 % injection 5-40 mL  5-40 mL IntraVENous 2 times per day Sam Hartman MD   10 mL at 05/23/25 0842    sodium chloride flush 0.9 % injection 5-40 mL  5-40 mL IntraVENous PRN Sam Hartman MD        0.9 % sodium chloride infusion   IntraVENous PRN Sam Hartman MD        potassium chloride (KLOR-CON M) extended release tablet 40 mEq  40 mEq Oral PRN Sam Hartman MD   40 mEq at 05/22/25 0851    Or    potassium bicarb-citric acid (EFFER-K) effervescent tablet 40 mEq  40 mEq Oral PRN Sam Hartman MD        Or    potassium chloride 10 mEq/100 mL IVPB (Peripheral Line)  10 mEq IntraVENous PRN Sam Hartman MD   Stopped at 05/22/25 0303    magnesium sulfate 2000 mg in 50 mL IVPB premix  2,000 mg IntraVENous PRN Sam Hartman MD   Stopped at 05/22/25 0652    enoxaparin (LOVENOX) injection 40 mg  40 mg SubCUTAneous BID Sam Hartman MD   40 mg at 05/23/25 0826    ondansetron (ZOFRAN-ODT) disintegrating tablet 4 mg  4 mg Oral Q8H PRN Sam Hartman MD        Or    ondansetron (ZOFRAN) injection 4 mg  4 mg IntraVENous Q6H PRN Sam Hartman MD        acetaminophen (TYLENOL)  tablet 650 mg  650 mg Oral Q6H PRN Sam Hartman MD        Or    acetaminophen (TYLENOL) suppository 650 mg  650 mg Rectal Q6H PRN Sam Hartman MD        sodium phosphate 25.41 mmol in sodium chloride 0.9 % 250 mL IVPB  0.16 mmol/kg IntraVENous PRN Sam Hartman MD        Or    sodium phosphate 50.82 mmol in sodium chloride 0.9 % 250 mL IVPB  0.32 mmol/kg IntraVENous PRN Sam Hartman MD        polyethylene glycol (GLYCOLAX) packet 17 g  17 g Oral BID PRN Sam Hartman MD        melatonin disintegrating tablet 5 mg  5 mg Oral Nightly PRN Sam Hartman MD        calcium carbonate (TUMS) chewable tablet 500 mg  500 mg Oral TID PRN Sam Hartman MD        spironolactone (ALDACTONE) tablet 50 mg  50 mg Oral Daily Megan Paiz MD   50 mg at 05/23/25 0826    albumin human 25% IV solution 25 g  25 g IntraVENous BID Megan Paiz  mL/hr at 05/23/25 0837 25 g at 05/23/25 0837     Allergies   Allergen Reactions    Bee Venom Swelling     SWELLS WHERE STUNG - has not had dyspnea with this     Principal Problem:    Cellulitis of scrotum  Active Problems:    Genital edema, male    Scrotal swelling    Cirrhosis of liver with ascites (HCC)    Anasarca  Resolved Problems:    * No resolved hospital problems. *    Blood pressure 118/70, pulse 93, temperature 97.9 °F (36.6 °C), temperature source Oral, resp. rate 18, height 1.905 m (6' 3\"), weight (!) 151 kg (332 lb 14.3 oz), SpO2 95%.    Subjective scrotal swelling is better.  Wife at bedside agrees.      Objective-  scrotal skin subcutaneous edema.  No hydrocele palpable.   Some epidermalysis posterior on scrotum without active cellulitis.  Minimal weeping mostly eschar.   Assessment & Plan  Continued elevation of scrotum.    Harley Rubalcava MD  5/23/2025

## 2025-05-24 VITALS
HEIGHT: 75 IN | DIASTOLIC BLOOD PRESSURE: 67 MMHG | WEIGHT: 315 LBS | SYSTOLIC BLOOD PRESSURE: 115 MMHG | OXYGEN SATURATION: 98 % | BODY MASS INDEX: 39.17 KG/M2 | HEART RATE: 73 BPM | TEMPERATURE: 98.8 F | RESPIRATION RATE: 18 BRPM

## 2025-05-24 LAB
ANION GAP SERPL CALCULATED.3IONS-SCNC: 9 MMOL/L (ref 8–16)
BUN SERPL-MCNC: 9 MG/DL (ref 6–20)
CALCIUM SERPL-MCNC: 8.4 MG/DL (ref 8.6–10)
CHLORIDE SERPL-SCNC: 99 MMOL/L (ref 98–107)
CO2 SERPL-SCNC: 24 MMOL/L (ref 22–29)
CREAT SERPL-MCNC: 0.7 MG/DL (ref 0.7–1.2)
GLUCOSE SERPL-MCNC: 91 MG/DL (ref 70–99)
POTASSIUM SERPL-SCNC: 4 MMOL/L (ref 3.5–5)
SODIUM SERPL-SCNC: 132 MMOL/L (ref 136–145)

## 2025-05-24 PROCEDURE — 36415 COLL VENOUS BLD VENIPUNCTURE: CPT

## 2025-05-24 PROCEDURE — 6370000000 HC RX 637 (ALT 250 FOR IP): Performed by: HOSPITALIST

## 2025-05-24 PROCEDURE — 6360000002 HC RX W HCPCS: Performed by: HOSPITALIST

## 2025-05-24 PROCEDURE — 80048 BASIC METABOLIC PNL TOTAL CA: CPT

## 2025-05-24 PROCEDURE — 2500000003 HC RX 250 WO HCPCS: Performed by: HOSPITALIST

## 2025-05-24 PROCEDURE — 6370000000 HC RX 637 (ALT 250 FOR IP): Performed by: INTERNAL MEDICINE

## 2025-05-24 RX ADMIN — WATER 2000 MG: 1 INJECTION INTRAMUSCULAR; INTRAVENOUS; SUBCUTANEOUS at 01:25

## 2025-05-24 RX ADMIN — POTASSIUM CHLORIDE 40 MEQ: 1500 TABLET, EXTENDED RELEASE ORAL at 08:02

## 2025-05-24 RX ADMIN — BUMETANIDE 2 MG: 1 TABLET ORAL at 08:03

## 2025-05-24 RX ADMIN — SPIRONOLACTONE 50 MG: 50 TABLET ORAL at 08:03

## 2025-05-24 RX ADMIN — PROPRANOLOL HYDROCHLORIDE 10 MG: 10 TABLET ORAL at 08:03

## 2025-05-24 NOTE — PLAN OF CARE
Problem: Pain  Goal: Verbalizes/displays adequate comfort level or baseline comfort level  Outcome: Progressing     Problem: Skin/Tissue Integrity - Adult  Goal: Skin integrity remains intact  Outcome: Progressing

## 2025-05-24 NOTE — DISCHARGE SUMMARY
Discharge Summary    Date:5/24/2025        Patient Name:Hao Bradley     YOB: 1980     Age:44 y.o.    Admit Date:5/19/2025   Admission Condition:fair   Discharged Condition:stable  Discharge Date: 05/24/25       Discharge Diagnoses   Principal Problem:    Cellulitis of scrotum  Active Problems:    Genital edema, male    Scrotal swelling    Cirrhosis of liver with ascites (HCC)    Anasarca  Resolved Problems:    * No resolved hospital problems. *      Hospital Stay   Narrative of Hospital Course:     44-year-old male with a history of morbid obesity, alcohol use and denies history of alcohol abuse, presents to the hospital with concerns of scrotal swelling and anasarca.  On admission was noted to have significant abdominal ascites requiring paracentesis, and placed on Bumex drip. CT of the abdomen and pelvis noted cirrhosis, splenomegaly, massive amount of fluid in the scrotum, pleural effusion, esophageal and splenic varices.  Was seen by gastroenterologist, initiated on spironolactone 50 mg daily, low-sodium diet with 2 L fluid restriction, viral hepatitis panel negative, right upper quadrant ultrasound obtained.  Ultrasound was negative for obstruction, vitamin K was given 10 mg p.o. for 3 days given coagulopathy with INR of 1.7. Patient admitted in-house for decompensated cirrhosis, s/p paracentesis with 6.5L removed,  improved volume wise however scrotal edema still persists and will likely take time to resolve. Responded well to IV bumex, transitioned to oral with conitnued great response. Seen by dietician for education on dietary regimen, Medications prescribed to pharmacy and patient to follow up with GI outpt.        Physical Examination:  General: Well-developed, no acute distress lying comfortably in bed.  HEENT: Atraumatic normocephalic, range of motion normal, no JVD, no tracheal deviation noted.  Cardiac: Normal S1-S2 no murmurs   Respiratory: clear To auscultation bilaterally, no rhonchi

## 2025-05-25 LAB
BACTERIA BLD CULT ORG #2: NORMAL
BACTERIA BLD CULT: NORMAL
EKG P AXIS: 7 DEGREES
EKG P-R INTERVAL: 160 MS
EKG Q-T INTERVAL: 352 MS
EKG QRS DURATION: 102 MS
EKG QTC CALCULATION (BAZETT): 434 MS
EKG T AXIS: 0 DEGREES

## 2025-06-03 ENCOUNTER — OFFICE VISIT (OUTPATIENT)
Dept: GASTROENTEROLOGY | Age: 45
End: 2025-06-03
Payer: COMMERCIAL

## 2025-06-03 VITALS
DIASTOLIC BLOOD PRESSURE: 120 MMHG | SYSTOLIC BLOOD PRESSURE: 170 MMHG | WEIGHT: 311 LBS | HEIGHT: 75 IN | OXYGEN SATURATION: 93 % | HEART RATE: 85 BPM | BODY MASS INDEX: 38.67 KG/M2

## 2025-06-03 DIAGNOSIS — D69.6 THROMBOCYTOPENIA: ICD-10-CM

## 2025-06-03 DIAGNOSIS — I85.10 SECONDARY ESOPHAGEAL VARICES WITHOUT BLEEDING (HCC): ICD-10-CM

## 2025-06-03 DIAGNOSIS — R60.9 EDEMA, UNSPECIFIED TYPE: ICD-10-CM

## 2025-06-03 DIAGNOSIS — R18.8 OTHER ASCITES: ICD-10-CM

## 2025-06-03 DIAGNOSIS — K70.31 ALCOHOLIC CIRRHOSIS OF LIVER WITH ASCITES (HCC): Primary | ICD-10-CM

## 2025-06-03 PROCEDURE — 99204 OFFICE O/P NEW MOD 45 MIN: CPT | Performed by: INTERNAL MEDICINE

## 2025-06-03 NOTE — PROGRESS NOTES
and risks (including bleeding, perforation and death)  for pursuing Endoscopy (EGD/Colonscopy/EUS/ERCP) with the patient and they are willing to continue. We also discussed the need for anesthesia, IV access, proper dietary changes, medication changes if necessary, and need for bowel prep (if ordered) prior to their Endoscopic procedure.  They are aware they must have someone accompany them to their scheduled procedure to drive them home - they agree to the above and are willing to continue.                       HPI      - Very pleasant 45-year-old male known to me outside of work socially.  He unfortunately experienced an episode of ascites and edema which required hospitalization approximately 2 to 3 weeks ago.  He does have a history of alcohol use but has never been told that he had chronic liver disease no cirrhosis.  He has stopped drinking alcohol since finding out this news.  He was here at [facility] for a few days requiring IV diuretics, paracentesis and optimization of his fluid status.  He was discharged on low-sodium diet as well as a fluid restriction.  He was given Bumex, Aldactone and potassium.    He has had a massive diuresis and a significant weight loss of well over 40 pounds or more.  He is currently taking Bumex 1 mg twice daily.  He is also having to supplement potassium supplements with this as well.  He is only on Aldactone 50 mg daily    Also of note he was discharged on propranolol.  The exact indications for this are unclear to the patient.  Best I can tell this may have been started for an attempt at controlling his portal hypertension as he was noted to have some varices on imaging.  He has never had an EGD or any bleeding episodes from his varices.    No history of hepatic encephalopathy.    Most recent MELD score was calculated at 17.  Notably this is based off an old INR from his hospitalization.  That it has not been rechecked since he was discharged.  He has

## 2025-06-05 ASSESSMENT — ENCOUNTER SYMPTOMS
SHORTNESS OF BREATH: 0
EYES NEGATIVE: 1
VOICE CHANGE: 0
BACK PAIN: 0
TROUBLE SWALLOWING: 0
CHEST TIGHTNESS: 0
COUGH: 0
BLOOD IN STOOL: 0
ABDOMINAL DISTENTION: 1
NAUSEA: 0
ALLERGIC/IMMUNOLOGIC NEGATIVE: 1
RECTAL PAIN: 0
SORE THROAT: 0
DIARRHEA: 0
CONSTIPATION: 0
ABDOMINAL PAIN: 0
VOMITING: 0

## 2025-06-12 DIAGNOSIS — K70.31 ALCOHOLIC CIRRHOSIS OF LIVER WITH ASCITES (HCC): ICD-10-CM

## 2025-06-18 ENCOUNTER — TELEPHONE (OUTPATIENT)
Dept: GASTROENTEROLOGY | Age: 45
End: 2025-06-18

## 2025-06-18 DIAGNOSIS — K70.31 ALCOHOLIC CIRRHOSIS OF LIVER WITH ASCITES (HCC): Primary | ICD-10-CM

## 2025-06-18 DIAGNOSIS — R60.9 EDEMA, UNSPECIFIED TYPE: ICD-10-CM

## 2025-06-18 DIAGNOSIS — D69.6 THROMBOCYTOPENIA: ICD-10-CM

## 2025-06-18 DIAGNOSIS — I85.10 SECONDARY ESOPHAGEAL VARICES WITHOUT BLEEDING (HCC): ICD-10-CM

## 2025-06-18 DIAGNOSIS — R18.8 OTHER ASCITES: ICD-10-CM

## 2025-06-18 NOTE — TELEPHONE ENCOUNTER
Dr Cobb reviewed patient's recent labs from [facility] and advised to have CMP and INR in 6 wks. He will notify pt of results. I will make lab orders and mail them to the patient and make a reminder when he is due to contact him. Also it was noted his MELD is 14 right now.

## 2025-07-23 DIAGNOSIS — I85.10 SECONDARY ESOPHAGEAL VARICES WITHOUT BLEEDING (HCC): ICD-10-CM

## 2025-07-23 DIAGNOSIS — R18.8 OTHER ASCITES: ICD-10-CM

## 2025-07-23 DIAGNOSIS — K70.31 ALCOHOLIC CIRRHOSIS OF LIVER WITH ASCITES (HCC): ICD-10-CM

## 2025-07-23 DIAGNOSIS — D69.6 THROMBOCYTOPENIA: ICD-10-CM

## 2025-07-23 DIAGNOSIS — R60.9 EDEMA, UNSPECIFIED TYPE: ICD-10-CM

## 2025-07-23 LAB
ALBUMIN SERPL-MCNC: 3.1 G/DL (ref 3.5–5.2)
ALP SERPL-CCNC: 179 U/L (ref 40–129)
ALT SERPL-CCNC: 47 U/L (ref 10–50)
ANION GAP SERPL CALCULATED.3IONS-SCNC: 6 MMOL/L (ref 8–16)
AST SERPL-CCNC: 79 U/L (ref 10–50)
BILIRUB SERPL-MCNC: 1.9 MG/DL (ref 0.2–1.2)
BUN SERPL-MCNC: 15 MG/DL (ref 6–20)
CALCIUM SERPL-MCNC: 8.7 MG/DL (ref 8.6–10)
CHLORIDE SERPL-SCNC: 107 MMOL/L (ref 98–107)
CO2 SERPL-SCNC: 25 MMOL/L (ref 22–29)
CREAT SERPL-MCNC: 0.7 MG/DL (ref 0.7–1.2)
GLUCOSE SERPL-MCNC: 93 MG/DL (ref 70–99)
INR PPP: 1.53 (ref 0.88–1.18)
POTASSIUM SERPL-SCNC: 4.8 MMOL/L (ref 3.5–5.1)
PROT SERPL-MCNC: 7.5 G/DL (ref 6.4–8.3)
PROTHROMBIN TIME: 18.1 SEC (ref 12–14.6)
SODIUM SERPL-SCNC: 138 MMOL/L (ref 136–145)

## 2025-07-24 ENCOUNTER — TELEPHONE (OUTPATIENT)
Dept: GASTROENTEROLOGY | Age: 45
End: 2025-07-24

## 2025-07-24 NOTE — TELEPHONE ENCOUNTER
called patient to confirm  procedure scheduled for  7/29/25@916 with Dr. Cobb at [facility] outpatient    PATIENT CONFIRMED

## 2025-07-29 ENCOUNTER — ANESTHESIA (OUTPATIENT)
Dept: ENDOSCOPY | Age: 45
End: 2025-07-29
Payer: COMMERCIAL

## 2025-07-29 ENCOUNTER — HOSPITAL ENCOUNTER (OUTPATIENT)
Age: 45
Setting detail: OUTPATIENT SURGERY
Discharge: HOME | End: 2025-07-29
Attending: INTERNAL MEDICINE | Admitting: INTERNAL MEDICINE
Payer: COMMERCIAL

## 2025-07-29 ENCOUNTER — ANCILLARY PROCEDURE (OUTPATIENT)
Dept: ENDOSCOPY | Age: 45
End: 2025-07-29
Attending: INTERNAL MEDICINE
Payer: COMMERCIAL

## 2025-07-29 ENCOUNTER — ANESTHESIA EVENT (OUTPATIENT)
Dept: ENDOSCOPY | Age: 45
End: 2025-07-29
Payer: COMMERCIAL

## 2025-07-29 ENCOUNTER — TELEPHONE (OUTPATIENT)
Dept: GASTROENTEROLOGY | Age: 45
End: 2025-07-29

## 2025-07-29 VITALS
TEMPERATURE: 98.9 F | WEIGHT: 310 LBS | DIASTOLIC BLOOD PRESSURE: 79 MMHG | RESPIRATION RATE: 13 BRPM | OXYGEN SATURATION: 96 % | HEART RATE: 77 BPM | BODY MASS INDEX: 41.99 KG/M2 | SYSTOLIC BLOOD PRESSURE: 138 MMHG | HEIGHT: 72 IN

## 2025-07-29 DIAGNOSIS — Z87.19 HX OF ESOPHAGEAL VARICES: ICD-10-CM

## 2025-07-29 PROBLEM — K76.6 PORTAL HYPERTENSIVE GASTROPATHY (HCC): Status: ACTIVE | Noted: 2025-07-29

## 2025-07-29 PROBLEM — K31.89 PORTAL HYPERTENSIVE GASTROPATHY (HCC): Status: ACTIVE | Noted: 2025-07-29

## 2025-07-29 PROBLEM — I85.10 SECONDARY ESOPHAGEAL VARICES WITHOUT BLEEDING (HCC): Status: ACTIVE | Noted: 2025-07-29

## 2025-07-29 PROBLEM — K29.80 DUODENITIS: Status: ACTIVE | Noted: 2025-07-29

## 2025-07-29 PROCEDURE — 3609012400 HC EGD TRANSORAL BIOPSY SINGLE/MULTIPLE: Performed by: INTERNAL MEDICINE

## 2025-07-29 PROCEDURE — 3700000000 HC ANESTHESIA ATTENDED CARE: Performed by: INTERNAL MEDICINE

## 2025-07-29 PROCEDURE — 88305 TISSUE EXAM BY PATHOLOGIST: CPT

## 2025-07-29 PROCEDURE — 88305 TISSUE EXAM BY PATHOLOGIST: CPT | Performed by: PATHOLOGY

## 2025-07-29 PROCEDURE — 7100000011 HC PHASE II RECOVERY - ADDTL 15 MIN: Performed by: INTERNAL MEDICINE

## 2025-07-29 PROCEDURE — 6360000002 HC RX W HCPCS: Performed by: NURSE ANESTHETIST, CERTIFIED REGISTERED

## 2025-07-29 PROCEDURE — 2709999900 HC NON-CHARGEABLE SUPPLY: Performed by: INTERNAL MEDICINE

## 2025-07-29 PROCEDURE — 3609012300 HC EGD BAND LIGATION ESOPHGEAL/GASTRIC VARICES: Performed by: INTERNAL MEDICINE

## 2025-07-29 PROCEDURE — 2720000010 HC SURG SUPPLY STERILE: Performed by: INTERNAL MEDICINE

## 2025-07-29 PROCEDURE — 7100000010 HC PHASE II RECOVERY - FIRST 15 MIN: Performed by: INTERNAL MEDICINE

## 2025-07-29 PROCEDURE — 3700000001 HC ADD 15 MINUTES (ANESTHESIA): Performed by: INTERNAL MEDICINE

## 2025-07-29 PROCEDURE — 6370000000 HC RX 637 (ALT 250 FOR IP): Performed by: INTERNAL MEDICINE

## 2025-07-29 PROCEDURE — 2580000003 HC RX 258: Performed by: ANESTHESIOLOGY

## 2025-07-29 RX ORDER — FENTANYL CITRATE 50 UG/ML
INJECTION, SOLUTION INTRAMUSCULAR; INTRAVENOUS
Status: DISCONTINUED | OUTPATIENT
Start: 2025-07-29 | End: 2025-07-29 | Stop reason: SDUPTHER

## 2025-07-29 RX ORDER — PANTOPRAZOLE SODIUM 40 MG/1
40 TABLET, DELAYED RELEASE ORAL
Qty: 180 TABLET | Refills: 1 | Status: SHIPPED | OUTPATIENT
Start: 2025-07-29

## 2025-07-29 RX ORDER — ONDANSETRON 2 MG/ML
INJECTION INTRAMUSCULAR; INTRAVENOUS
Status: DISCONTINUED | OUTPATIENT
Start: 2025-07-29 | End: 2025-07-29 | Stop reason: SDUPTHER

## 2025-07-29 RX ORDER — LIDOCAINE HYDROCHLORIDE 10 MG/ML
INJECTION, SOLUTION EPIDURAL; INFILTRATION; INTRACAUDAL; PERINEURAL
Status: DISCONTINUED | OUTPATIENT
Start: 2025-07-29 | End: 2025-07-29 | Stop reason: SDUPTHER

## 2025-07-29 RX ORDER — SODIUM CHLORIDE, SODIUM LACTATE, POTASSIUM CHLORIDE, CALCIUM CHLORIDE 600; 310; 30; 20 MG/100ML; MG/100ML; MG/100ML; MG/100ML
INJECTION, SOLUTION INTRAVENOUS CONTINUOUS
Status: DISCONTINUED | OUTPATIENT
Start: 2025-07-29 | End: 2025-07-29 | Stop reason: HOSPADM

## 2025-07-29 RX ORDER — SUCRALFATE ORAL 1 G/10ML
1 SUSPENSION ORAL 4 TIMES DAILY
Qty: 1200 ML | Refills: 3 | Status: SHIPPED | OUTPATIENT
Start: 2025-07-29

## 2025-07-29 RX ORDER — PROPOFOL 10 MG/ML
INJECTION, EMULSION INTRAVENOUS
Status: DISCONTINUED | OUTPATIENT
Start: 2025-07-29 | End: 2025-07-29 | Stop reason: SDUPTHER

## 2025-07-29 RX ADMIN — ONDANSETRON 4 MG: 2 INJECTION, SOLUTION INTRAMUSCULAR; INTRAVENOUS at 09:20

## 2025-07-29 RX ADMIN — PROPOFOL 180 MCG/KG/MIN: 10 INJECTION, EMULSION INTRAVENOUS at 08:54

## 2025-07-29 RX ADMIN — LIDOCAINE HYDROCHLORIDE: 20 SOLUTION ORAL; TOPICAL at 09:56

## 2025-07-29 RX ADMIN — SODIUM CHLORIDE, SODIUM LACTATE, POTASSIUM CHLORIDE, AND CALCIUM CHLORIDE: 600; 310; 30; 20 INJECTION, SOLUTION INTRAVENOUS at 08:50

## 2025-07-29 RX ADMIN — PROPOFOL 100 MG: 10 INJECTION, EMULSION INTRAVENOUS at 08:53

## 2025-07-29 RX ADMIN — HYDROMORPHONE HYDROCHLORIDE 1 MG: 1 INJECTION, SOLUTION INTRAMUSCULAR; INTRAVENOUS; SUBCUTANEOUS at 09:19

## 2025-07-29 RX ADMIN — LIDOCAINE HYDROCHLORIDE 50 MG: 10 INJECTION, SOLUTION EPIDURAL; INFILTRATION; INTRACAUDAL; PERINEURAL at 08:53

## 2025-07-29 RX ADMIN — FENTANYL CITRATE 50 MCG: 50 INJECTION, SOLUTION INTRAMUSCULAR; INTRAVENOUS at 08:53

## 2025-07-29 ASSESSMENT — PAIN - FUNCTIONAL ASSESSMENT
PAIN_FUNCTIONAL_ASSESSMENT: 0-10

## 2025-07-29 NOTE — TELEPHONE ENCOUNTER
Fred,    Per Dr Cobb:    IMPRESSION:  1. Esophageal Varices s/p banding  2. Portal hypertensive gastropathy  3. Duodenitis.      RECOMMENDATIONS:    1.  Await path results  2.  Soft food x 2-3 days  3.  PPI twice daily  4.  GI cocktail in recovery  5.  Carafate TID  6.  Repeat EGD in 3 months for repeat banding as needed.   7.  Okay for post op pain meds as needed for acute procedure pain.      The results were discussed with the patient and family.  A copy of the images obtained were given to the patient.      Mayito Cobb MD  7/29/2025  9:19 AM

## 2025-07-29 NOTE — ANESTHESIA PRE PROCEDURE
Department of Anesthesiology  Preprocedure Note       Name:  Hao Bradley   Age:  45 y.o.  :  1980                                          MRN:  [id number]         Date:  2025      Surgeon: Surgeon(s):  Mayito Cobb MD    Procedure: Procedure(s):  ESOPHAGOGASTRODUODENOSCOPY BIOPSY    Medications prior to admission:   Prior to Admission medications    Medication Sig Start Date End Date Taking? Authorizing Provider   bumetanide (BUMEX) 2 MG tablet Take 1 tablet by mouth in the morning and 1 tablet in the evening. 25  Yes Corina Taylor MD   spironolactone (ALDACTONE) 50 MG tablet Take 1 tablet by mouth daily 25  Yes Corina Taylor MD   potassium chloride (KLOR-CON M) 20 MEQ extended release tablet Take 1 tablet by mouth 2 times daily 25  Yes Corina Taylor MD   propranolol (INDERAL) 10 MG tablet Take 1 tablet by mouth 3 times daily  Patient not taking: Reported on 2025   Corina Taylor MD       Current medications:    Current Facility-Administered Medications   Medication Dose Route Frequency Provider Last Rate Last Admin   • lactated ringers infusion   IntraVENous Continuous Radha Dunbar MD           Allergies:    Allergies   Allergen Reactions   • Bee Venom Swelling     SWELLS WHERE STUNG - has not had dyspnea with this       Problem List:    Patient Active Problem List   Diagnosis Code   • Cellulitis of scrotum N49.2   • Genital edema, male N50.89   • Scrotal swelling N50.89   • Cirrhosis of liver with ascites (HCC) K74.60, R18.8   • Anasarca R60.1       Past Medical History:        Diagnosis Date   • Alcohol abuse, in remission    • Obesity, morbid (more than 100 lbs over ideal weight or BMI > 40) (HCC)        Past Surgical History:        Procedure Laterality Date   • INCISION AND DRAINAGE OF TONSILLAR ABSCESS Bilateral        Social History:    Social History     Tobacco Use   • Smoking status: Never   • Smokeless tobacco: Never   Substance Use Topics

## 2025-07-29 NOTE — ANESTHESIA POSTPROCEDURE EVALUATION
Department of Anesthesiology  Postprocedure Note    Patient: Hao Bradley  MRN: [id number]  YOB: 1980  Date of evaluation: 7/29/2025    Procedure Summary       Date: 07/29/25 Room / Location: [place] [address] / [facility]    Anesthesia Start: 0850 Anesthesia Stop: 0923    Procedures:       ESOPHAGOGASTRODUODENOSCOPY BIOPSY      ESOPHAGOGASTRODUODENOSCOPY BAND LIGATION Diagnosis:       Hx of esophageal varices      (Hx of esophageal varices [Z87.19])    Surgeons: Mayito Cobb MD Responsible Provider: Radhames Anna APRN - CRNA    Anesthesia Type: general, TIVA ASA Status: 3            Anesthesia Type: No value filed.    Shahnaz Phase I: Shahnaz Score: 10    Shahnaz Phase II:      Anesthesia Post Evaluation    Patient location during evaluation: bedside  Patient participation: complete - patient participated  Level of consciousness: sleepy but conscious  Pain score: 0  Airway patency: patent  Nausea & Vomiting: no nausea and no vomiting  Cardiovascular status: hemodynamically stable  Respiratory status: acceptable, spontaneous ventilation and room air  Hydration status: euvolemic  Pain management: adequate    No notable events documented.

## 2025-07-29 NOTE — OP NOTE
Endoscopic Procedure Note    Patient: Hao Bradley : 1980  Med Rec#: [id number] Acc#: [id number]     Primary Care Provider/Referring Provider: Maldonado Llanos PA      Endoscopist: Mayito Cobb MD    Date of Procedure:  2025    Procedure:   1. EGD with variceal banding x 6  2. EGD with biopsy    Indications:   1. Portal hypertension  2. Cirrhosis - MELD 14     Anesthesia:  Sedation was administered by anesthesia who monitored the patient during the procedure.    Estimated Blood Loss: minimal    Procedure:   After reviewing the patient's chart and obtaining informed consent, the patient was placed in the left lateral decubitus position.  A forward-viewing Olympus endoscope was lubricated and inserted through the mouth into the oropharynx. Under direct visualization, the upper esophagus was intubated. The scope was advanced to the level of the third portion of duodenum. Scope was slowly withdrawn with careful inspection of the mucosal surfaces. The scope was retroflexed for inspection of the gastric fundus and incisura. Findings and maneuvers are listed in impression below. The patient tolerated the procedure well. The scope was removed. There were no immediate complications.    Findings:   Esophagus: abnormal: in the distal esophagus there are 3-4 columns of varices noted. These appeared to be grade II (not flattened with insufflation). No high risk stigmata noted. These appeared to extend from 40cm to 33cm. Using a BS 7-shooter, a total of 6 bands were placed. There was incomplete eradication.   There is no hiatal hernia present.      Stomach:  abnormal: portal hypertensive gastropathy noted. In the gastric antrum, there are mucosal changes suggestive of gastritis noted.       Duodenum: abnormal: duodenum appeared mildly inflamed. In the area of the ampulla, the tissue appeared more edematous than expected. No focal mass - biopsied.       IMPRESSION:  1. Esophageal Varices s/p banding  2. Portal

## 2025-07-29 NOTE — H&P
Patient Name: Hao Bradley  : 1980  MRN: [id number]  DATE: 25    Allergies:   Allergies   Allergen Reactions    Bee Venom Swelling     SWELLS WHERE STUNG - has not had dyspnea with this        ENDOSCOPY  History and Physical    Procedure:    [] Diagnostic Colonoscopy       [] Screening Colonoscopy  [x] EGD      [] ERCP      [] EUS       [] Other    [x] Previous office notes/History and Physical reviewed from the patients chart. Please see EMR for further details of HPI. I have examined the patient's status immediately prior to the procedure and:      Indications/HPI:    []Abdominal Pain   []Barretts  []Screening/Surveillance   []History of Polyps  []Dysphagia            [] +Cologard/DNA testing  []Abnormal Imaging              []EOE Hx              [] Family Hx of CRC/Polyps  []Anemia                            []Food Impaction       []Recent Poor Prep  []GI Bleed             []Lymphadenopathy  []History of Polyps  []Change in bowel habits []Heartburn/Reflux  []Cancer- GI/Lung  []Chest Pain - Non Cardiac []Heme (+) Stool []Ulcers  []Constipation  []Hemoptysis  []Incontinence    []Diarrhea  []Hypoxemia  []Rectal Bleed (BRBPR)  []Nausea/Vomiting   [x] Varices  []Crohns/Colitis  []Pancreatic Cyst   [] Cirrhosis   []Pancreatitis    []Abnormal MRCP  []Elevated LFT [] Stent Removal, Previous ERCP  []Other:     Anesthesia:   [x] MAC [] Moderate Sedation   [] General   [] None     ROS: 12 pt Review of Symptoms was negative unless mentioned above    Medications:   Prior to Admission medications    Medication Sig Start Date End Date Taking? Authorizing Provider   bumetanide (BUMEX) 2 MG tablet Take 1 tablet by mouth in the morning and 1 tablet in the evening. 25  Yes Corina Taylor MD   spironolactone (ALDACTONE) 50 MG tablet Take 1 tablet by mouth daily 25  Yes Corina Taylor MD   potassium chloride (KLOR-CON M) 20 MEQ extended release tablet Take 1 tablet by mouth 2 times daily 25

## 2025-07-29 NOTE — DISCHARGE INSTRUCTIONS
RECOMMENDATIONS:    1.  Await path results  2.  Soft food x 2-3 days  3.  PPI twice daily  4.  GI cocktail in recovery  5.  Carafate TID  6.  Repeat EGD in 3 months for repeat banding as needed.   7.  Okay for post op pain meds as needed for acute procedure pain.     Upper GI Endoscopy: What to Expect at Home  Your Recovery  You had an upper GI endoscopy. Your doctor used a thin, lighted tube that bends to look at the inside of your esophagus, your stomach, and the first part of the small intestine, called the duodenum.    How can you care for yourself at home?  Activity   Rest as much as you need to after you go home.  You should be able to go back to your usual activities the day after the test.  Due to anesthesia, no driving or operating equipment for 24 hours.  Diet   Follow your doctor's directions for eating after the test.  Drink plenty of fluids (unless your doctor has told you not to).  Medications   If you have a sore throat the day after the test, use an over-the-counter spray to numb your throat.  When should you call for help?   Call 911 anytime you think you may need emergency care. For example, call if:    You passed out (lost consciousness).     You have trouble breathing.     You pass maroon or bloody stools.   Call your doctor now or seek immediate medical care if:    You have pain that does not get better after your take pain medicine.     You have new or worse belly pain.     You have blood in your stools.     You are sick to your stomach and cannot keep fluids down.     You have a fever.     You cannot pass stools or gas.   Watch closely for changes in your health, and be sure to contact your doctor if:    Your throat still hurts after a day or two.     You do not get better as expected.   Upper GI Endoscopy: What to Expect at Home  Your Recovery  You had an upper GI endoscopy. Your doctor used a thin, lighted tube that bends to look at the inside of your esophagus, your stomach, and the first

## (undated) DEVICE — MASK VENTILATOR MED AD SUPERNOVA ET

## (undated) DEVICE — LIGATOR ENDOSCP DIA8.6-11.5MM MULT DISP SPDBND LIGATOR SUP

## (undated) DEVICE — FORCEPS BX L240CM JAW DIA2.8MM L CAP W/ NDL MIC MESH TOOTH

## (undated) DEVICE — ENDO KIT,LOURDES HOSPITAL: Brand: MEDLINE INDUSTRIES, INC.